# Patient Record
Sex: MALE | Race: BLACK OR AFRICAN AMERICAN | NOT HISPANIC OR LATINO | Employment: OTHER | URBAN - METROPOLITAN AREA
[De-identification: names, ages, dates, MRNs, and addresses within clinical notes are randomized per-mention and may not be internally consistent; named-entity substitution may affect disease eponyms.]

---

## 2017-01-01 ENCOUNTER — HOSPITAL ENCOUNTER (INPATIENT)
Facility: HOSPITAL | Age: 67
LOS: 1 days | DRG: 283 | End: 2017-11-20
Attending: EMERGENCY MEDICINE | Admitting: FAMILY MEDICINE
Payer: MEDICARE

## 2017-01-01 VITALS
BODY MASS INDEX: 17.37 KG/M2 | OXYGEN SATURATION: 73 % | HEART RATE: 98 BPM | TEMPERATURE: 98 F | RESPIRATION RATE: 22 BRPM | SYSTOLIC BLOOD PRESSURE: 117 MMHG | WEIGHT: 135.38 LBS | DIASTOLIC BLOOD PRESSURE: 42 MMHG | HEIGHT: 74 IN

## 2017-01-01 DIAGNOSIS — J96.02 ACUTE HYPERCAPNIC RESPIRATORY FAILURE: ICD-10-CM

## 2017-01-01 DIAGNOSIS — I21.3 STEMI (ST ELEVATION MYOCARDIAL INFARCTION): ICD-10-CM

## 2017-01-01 DIAGNOSIS — I21.02 ST ELEVATION MYOCARDIAL INFARCTION INVOLVING LEFT ANTERIOR DESCENDING (LAD) CORONARY ARTERY: Primary | ICD-10-CM

## 2017-01-01 DIAGNOSIS — N17.9 AKI (ACUTE KIDNEY INJURY): ICD-10-CM

## 2017-01-01 DIAGNOSIS — Z97.8 ENDOTRACHEALLY INTUBATED: ICD-10-CM

## 2017-01-01 DIAGNOSIS — I21.3 ACUTE ST ELEVATION MYOCARDIAL INFARCTION (STEMI), UNSPECIFIED ARTERY: ICD-10-CM

## 2017-01-01 DIAGNOSIS — I50.43 HEART FAILURE, ACUTE ON CHRONIC, SYSTOLIC AND DIASTOLIC: ICD-10-CM

## 2017-01-01 DIAGNOSIS — K72.00 SHOCK LIVER: ICD-10-CM

## 2017-01-01 DIAGNOSIS — Z72.0 TOBACCO ABUSE: Chronic | ICD-10-CM

## 2017-01-01 DIAGNOSIS — R73.9 HYPERGLYCEMIA: ICD-10-CM

## 2017-01-01 DIAGNOSIS — I21.9 ACUTE MI: ICD-10-CM

## 2017-01-01 DIAGNOSIS — E87.20 METABOLIC ACIDOSIS: ICD-10-CM

## 2017-01-01 DIAGNOSIS — R57.0 CARDIOGENIC SHOCK: ICD-10-CM

## 2017-01-01 DIAGNOSIS — I46.9 CARDIAC ARREST: ICD-10-CM

## 2017-01-01 LAB
ALBUMIN SERPL BCP-MCNC: 2.2 G/DL
ALBUMIN SERPL BCP-MCNC: 2.4 G/DL
ALBUMIN SERPL BCP-MCNC: 3.1 G/DL
ALLENS TEST: ABNORMAL
ALP SERPL-CCNC: 151 U/L
ALP SERPL-CCNC: 175 U/L
ALP SERPL-CCNC: 188 U/L
ALT SERPL W/O P-5'-P-CCNC: 572 U/L
ALT SERPL W/O P-5'-P-CCNC: 586 U/L
ALT SERPL W/O P-5'-P-CCNC: 662 U/L
AMORPH CRY URNS QL MICRO: ABNORMAL
ANION GAP SERPL CALC-SCNC: 13 MMOL/L
ANION GAP SERPL CALC-SCNC: 14 MMOL/L
ANION GAP SERPL CALC-SCNC: 15 MMOL/L
ANION GAP SERPL CALC-SCNC: 21 MMOL/L
APTT BLDCRRT: 26.4 SEC
APTT BLDCRRT: >150 SEC
AST SERPL-CCNC: 526 U/L
AST SERPL-CCNC: 531 U/L
AST SERPL-CCNC: 668 U/L
BACTERIA #/AREA URNS HPF: ABNORMAL /HPF
BASOPHILS # BLD AUTO: 0.02 K/UL
BASOPHILS # BLD AUTO: 0.02 K/UL
BASOPHILS # BLD AUTO: 0.05 K/UL
BASOPHILS NFR BLD: 0.2 %
BASOPHILS NFR BLD: 0.2 %
BASOPHILS NFR BLD: 0.7 %
BILIRUB SERPL-MCNC: 0.3 MG/DL
BILIRUB SERPL-MCNC: 0.3 MG/DL
BILIRUB SERPL-MCNC: 0.5 MG/DL
BILIRUB UR QL STRIP: NEGATIVE
BNP SERPL-MCNC: 101 PG/ML
BUN SERPL-MCNC: 22 MG/DL
BUN SERPL-MCNC: 27 MG/DL
BUN SERPL-MCNC: 28 MG/DL
BUN SERPL-MCNC: 31 MG/DL
CALCIUM SERPL-MCNC: 6.7 MG/DL
CALCIUM SERPL-MCNC: 7.3 MG/DL
CALCIUM SERPL-MCNC: 7.5 MG/DL
CALCIUM SERPL-MCNC: 8.9 MG/DL
CHLORIDE SERPL-SCNC: 108 MMOL/L
CHLORIDE SERPL-SCNC: 112 MMOL/L
CHLORIDE SERPL-SCNC: 114 MMOL/L
CHLORIDE SERPL-SCNC: 116 MMOL/L
CHOLEST SERPL-MCNC: 111 MG/DL
CHOLEST/HDLC SERPL: 3.3 {RATIO}
CK SERPL-CCNC: 160 U/L
CLARITY UR: ABNORMAL
CO2 SERPL-SCNC: 11 MMOL/L
CO2 SERPL-SCNC: 13 MMOL/L
CO2 SERPL-SCNC: 14 MMOL/L
CO2 SERPL-SCNC: 19 MMOL/L
COLOR UR: YELLOW
CREAT SERPL-MCNC: 1.5 MG/DL
CREAT SERPL-MCNC: 1.6 MG/DL
CREAT SERPL-MCNC: 1.8 MG/DL
CREAT SERPL-MCNC: 2.2 MG/DL
DELSYS: ABNORMAL
DIASTOLIC DYSFUNCTION: YES
DIFFERENTIAL METHOD: ABNORMAL
EOSINOPHIL # BLD AUTO: 0 K/UL
EOSINOPHIL # BLD AUTO: 0.1 K/UL
EOSINOPHIL # BLD AUTO: 0.2 K/UL
EOSINOPHIL NFR BLD: 0.2 %
EOSINOPHIL NFR BLD: 0.6 %
EOSINOPHIL NFR BLD: 2.5 %
ERYTHROCYTE [DISTWIDTH] IN BLOOD BY AUTOMATED COUNT: 13.1 %
ERYTHROCYTE [DISTWIDTH] IN BLOOD BY AUTOMATED COUNT: 13.2 %
ERYTHROCYTE [DISTWIDTH] IN BLOOD BY AUTOMATED COUNT: 13.2 %
ERYTHROCYTE [SEDIMENTATION RATE] IN BLOOD BY WESTERGREN METHOD: 16 MM/H
ERYTHROCYTE [SEDIMENTATION RATE] IN BLOOD BY WESTERGREN METHOD: 16 MM/H
ERYTHROCYTE [SEDIMENTATION RATE] IN BLOOD BY WESTERGREN METHOD: 20 MM/H
EST. GFR  (AFRICAN AMERICAN): 35 ML/MIN/1.73 M^2
EST. GFR  (AFRICAN AMERICAN): 44 ML/MIN/1.73 M^2
EST. GFR  (AFRICAN AMERICAN): 51 ML/MIN/1.73 M^2
EST. GFR  (AFRICAN AMERICAN): 54.9 ML/MIN/1.73 M^2
EST. GFR  (NON AFRICAN AMERICAN): 30 ML/MIN/1.73 M^2
EST. GFR  (NON AFRICAN AMERICAN): 38 ML/MIN/1.73 M^2
EST. GFR  (NON AFRICAN AMERICAN): 44 ML/MIN/1.73 M^2
EST. GFR  (NON AFRICAN AMERICAN): 47.5 ML/MIN/1.73 M^2
ESTIMATED AVG GLUCOSE: 105 MG/DL
ESTIMATED PA SYSTOLIC PRESSURE: 21.16
FIO2: 100
FIO2: 50
FIO2: 65
GLUCOSE SERPL-MCNC: 290 MG/DL
GLUCOSE SERPL-MCNC: 325 MG/DL
GLUCOSE SERPL-MCNC: 331 MG/DL
GLUCOSE SERPL-MCNC: 424 MG/DL
GLUCOSE UR QL STRIP: NEGATIVE
HBA1C MFR BLD HPLC: 5.3 %
HCO3 UR-SCNC: 15.7 MMOL/L (ref 24–28)
HCO3 UR-SCNC: 17.7 MMOL/L (ref 24–28)
HCO3 UR-SCNC: 19.5 MMOL/L (ref 24–28)
HCT VFR BLD AUTO: 38 %
HCT VFR BLD AUTO: 38.6 %
HCT VFR BLD AUTO: 48.5 %
HDLC SERPL-MCNC: 34 MG/DL
HDLC SERPL: 30.6 %
HGB BLD-MCNC: 11.9 G/DL
HGB BLD-MCNC: 12.1 G/DL
HGB BLD-MCNC: 15.6 G/DL
HGB UR QL STRIP: ABNORMAL
HYALINE CASTS #/AREA URNS LPF: 0 /LPF
INR PPP: 1.3
INR PPP: 1.6
KETONES UR QL STRIP: ABNORMAL
LDLC SERPL CALC-MCNC: 62.8 MG/DL
LEUKOCYTE ESTERASE UR QL STRIP: NEGATIVE
LYMPHOCYTES # BLD AUTO: 1.1 K/UL
LYMPHOCYTES # BLD AUTO: 1.9 K/UL
LYMPHOCYTES # BLD AUTO: 5.4 K/UL
LYMPHOCYTES NFR BLD: 19.3 %
LYMPHOCYTES NFR BLD: 74.9 %
LYMPHOCYTES NFR BLD: 9.3 %
MAGNESIUM SERPL-MCNC: 2 MG/DL
MCH RBC QN AUTO: 31.7 PG
MCH RBC QN AUTO: 32.2 PG
MCH RBC QN AUTO: 32.2 PG
MCHC RBC AUTO-ENTMCNC: 30.8 G/DL
MCHC RBC AUTO-ENTMCNC: 31.8 G/DL
MCHC RBC AUTO-ENTMCNC: 32.2 G/DL
MCV RBC AUTO: 100 FL
MCV RBC AUTO: 101 FL
MCV RBC AUTO: 103 FL
MICROSCOPIC COMMENT: ABNORMAL
MITRAL VALVE MOBILITY: NORMAL
MITRAL VALVE REGURGITATION: ABNORMAL
MODE: ABNORMAL
MONOCYTES # BLD AUTO: 0.3 K/UL
MONOCYTES # BLD AUTO: 0.7 K/UL
MONOCYTES # BLD AUTO: 0.8 K/UL
MONOCYTES NFR BLD: 4.6 %
MONOCYTES NFR BLD: 7.1 %
MONOCYTES NFR BLD: 7.2 %
NEUTROPHILS # BLD AUTO: 1 K/UL
NEUTROPHILS # BLD AUTO: 7.2 K/UL
NEUTROPHILS # BLD AUTO: 9.5 K/UL
NEUTROPHILS NFR BLD: 17.3 %
NEUTROPHILS NFR BLD: 72.8 %
NEUTROPHILS NFR BLD: 83.5 %
NITRITE UR QL STRIP: NEGATIVE
NONHDLC SERPL-MCNC: 77 MG/DL
PCO2 BLDA: 45.4 MMHG (ref 35–45)
PCO2 BLDA: 47.7 MMHG (ref 35–45)
PCO2 BLDA: 50.5 MMHG (ref 35–45)
PEEP: 0
PEEP: 5
PH SMN: 7.15 [PH] (ref 7.35–7.45)
PH SMN: 7.18 [PH] (ref 7.35–7.45)
PH SMN: 7.2 [PH] (ref 7.35–7.45)
PH UR STRIP: 5 [PH] (ref 5–8)
PLATELET # BLD AUTO: 83 K/UL
PLATELET # BLD AUTO: 90 K/UL
PLATELET # BLD AUTO: 93 K/UL
PMV BLD AUTO: 12.7 FL
PMV BLD AUTO: 13.2 FL
PMV BLD AUTO: 13.4 FL
PO2 BLDA: 320 MMHG (ref 80–100)
PO2 BLDA: 57 MMHG (ref 80–100)
PO2 BLDA: 90 MMHG (ref 80–100)
POC BE: -11 MMOL/L
POC BE: -13 MMOL/L
POC BE: -9 MMOL/L
POC SATURATED O2: 100 % (ref 95–100)
POC SATURATED O2: 81 % (ref 95–100)
POC SATURATED O2: 94 % (ref 95–100)
POCT GLUCOSE: 305 MG/DL (ref 70–110)
POCT GLUCOSE: 426 MG/DL (ref 70–110)
POTASSIUM SERPL-SCNC: 3.6 MMOL/L
POTASSIUM SERPL-SCNC: 4.3 MMOL/L
POTASSIUM SERPL-SCNC: 4.6 MMOL/L
POTASSIUM SERPL-SCNC: 5.6 MMOL/L
PROT SERPL-MCNC: 4.3 G/DL
PROT SERPL-MCNC: 4.8 G/DL
PROT SERPL-MCNC: 6.7 G/DL
PROT UR QL STRIP: ABNORMAL
PROTHROMBIN TIME: 13.4 SEC
PROTHROMBIN TIME: 15.9 SEC
RBC # BLD AUTO: 3.75 M/UL
RBC # BLD AUTO: 3.76 M/UL
RBC # BLD AUTO: 4.84 M/UL
RBC #/AREA URNS HPF: 10 /HPF (ref 0–4)
RETIRED EF AND QEF - SEE NOTES: 15 (ref 55–65)
SAMPLE: ABNORMAL
SITE: ABNORMAL
SODIUM SERPL-SCNC: 141 MMOL/L
SODIUM SERPL-SCNC: 142 MMOL/L
SODIUM SERPL-SCNC: 143 MMOL/L
SODIUM SERPL-SCNC: 144 MMOL/L
SP GR UR STRIP: 1.03 (ref 1–1.03)
SQUAMOUS #/AREA URNS HPF: 1 /HPF
T4 FREE SERPL-MCNC: 0.87 NG/DL
TRICUSPID VALVE REGURGITATION: ABNORMAL
TRIGL SERPL-MCNC: 71 MG/DL
TROPONIN I SERPL DL<=0.01 NG/ML-MCNC: 0.11 NG/ML
TROPONIN I SERPL DL<=0.01 NG/ML-MCNC: 13.71 NG/ML
TROPONIN I SERPL DL<=0.01 NG/ML-MCNC: 5.78 NG/ML
TSH SERPL DL<=0.005 MIU/L-ACNC: 0.09 UIU/ML
URN SPEC COLLECT METH UR: ABNORMAL
UROBILINOGEN UR STRIP-ACNC: NEGATIVE EU/DL
VT: 400
WBC # BLD AUTO: 11.38 K/UL
WBC # BLD AUTO: 7.21 K/UL
WBC # BLD AUTO: 9.9 K/UL
WBC #/AREA URNS HPF: 2 /HPF (ref 0–5)

## 2017-01-01 PROCEDURE — 27100108

## 2017-01-01 PROCEDURE — 83880 ASSAY OF NATRIURETIC PEPTIDE: CPT

## 2017-01-01 PROCEDURE — 99291 CRITICAL CARE FIRST HOUR: CPT | Mod: 25

## 2017-01-01 PROCEDURE — 25000003 PHARM REV CODE 250: Performed by: NURSE PRACTITIONER

## 2017-01-01 PROCEDURE — 85025 COMPLETE CBC W/AUTO DIFF WBC: CPT | Mod: 91

## 2017-01-01 PROCEDURE — 84484 ASSAY OF TROPONIN QUANT: CPT

## 2017-01-01 PROCEDURE — 20000000 HC ICU ROOM

## 2017-01-01 PROCEDURE — 83036 HEMOGLOBIN GLYCOSYLATED A1C: CPT

## 2017-01-01 PROCEDURE — 36415 COLL VENOUS BLD VENIPUNCTURE: CPT

## 2017-01-01 PROCEDURE — 85730 THROMBOPLASTIN TIME PARTIAL: CPT

## 2017-01-01 PROCEDURE — 36600 WITHDRAWAL OF ARTERIAL BLOOD: CPT

## 2017-01-01 PROCEDURE — 81000 URINALYSIS NONAUTO W/SCOPE: CPT

## 2017-01-01 PROCEDURE — 80053 COMPREHEN METABOLIC PANEL: CPT

## 2017-01-01 PROCEDURE — 85610 PROTHROMBIN TIME: CPT

## 2017-01-01 PROCEDURE — 96366 THER/PROPH/DIAG IV INF ADDON: CPT | Mod: 59

## 2017-01-01 PROCEDURE — 63600175 PHARM REV CODE 636 W HCPCS: Performed by: EMERGENCY MEDICINE

## 2017-01-01 PROCEDURE — 36556 INSERT NON-TUNNEL CV CATH: CPT

## 2017-01-01 PROCEDURE — 94760 N-INVAS EAR/PLS OXIMETRY 1: CPT

## 2017-01-01 PROCEDURE — 31500 INSERT EMERGENCY AIRWAY: CPT

## 2017-01-01 PROCEDURE — 27000221 HC OXYGEN, UP TO 24 HOURS

## 2017-01-01 PROCEDURE — 93010 ELECTROCARDIOGRAM REPORT: CPT | Mod: ,,, | Performed by: INTERNAL MEDICINE

## 2017-01-01 PROCEDURE — 80061 LIPID PANEL: CPT

## 2017-01-01 PROCEDURE — 96376 TX/PRO/DX INJ SAME DRUG ADON: CPT | Mod: 59

## 2017-01-01 PROCEDURE — 94003 VENT MGMT INPAT SUBQ DAY: CPT

## 2017-01-01 PROCEDURE — 82803 BLOOD GASES ANY COMBINATION: CPT

## 2017-01-01 PROCEDURE — 96365 THER/PROPH/DIAG IV INF INIT: CPT | Mod: 59

## 2017-01-01 PROCEDURE — 99291 CRITICAL CARE FIRST HOUR: CPT | Mod: ,,, | Performed by: INTERNAL MEDICINE

## 2017-01-01 PROCEDURE — 63600175 PHARM REV CODE 636 W HCPCS: Performed by: NURSE PRACTITIONER

## 2017-01-01 PROCEDURE — 99292 CRITICAL CARE ADDL 30 MIN: CPT | Mod: 25,,, | Performed by: NURSE PRACTITIONER

## 2017-01-01 PROCEDURE — 84439 ASSAY OF FREE THYROXINE: CPT

## 2017-01-01 PROCEDURE — 84443 ASSAY THYROID STIM HORMONE: CPT

## 2017-01-01 PROCEDURE — 25000003 PHARM REV CODE 250

## 2017-01-01 PROCEDURE — 99291 CRITICAL CARE FIRST HOUR: CPT | Mod: 25,,, | Performed by: NURSE PRACTITIONER

## 2017-01-01 PROCEDURE — 99900035 HC TECH TIME PER 15 MIN (STAT)

## 2017-01-01 PROCEDURE — 82550 ASSAY OF CK (CPK): CPT

## 2017-01-01 PROCEDURE — 85025 COMPLETE CBC W/AUTO DIFF WBC: CPT

## 2017-01-01 PROCEDURE — 93010 ELECTROCARDIOGRAM REPORT: CPT | Mod: 76,,, | Performed by: NUCLEAR MEDICINE

## 2017-01-01 PROCEDURE — 96367 TX/PROPH/DG ADDL SEQ IV INF: CPT | Mod: 59

## 2017-01-01 PROCEDURE — 31500 INSERT EMERGENCY AIRWAY: CPT | Mod: 59,,, | Performed by: NURSE PRACTITIONER

## 2017-01-01 PROCEDURE — 80053 COMPREHEN METABOLIC PANEL: CPT | Mod: 91

## 2017-01-01 PROCEDURE — 93306 TTE W/DOPPLER COMPLETE: CPT | Mod: 26,,, | Performed by: INTERNAL MEDICINE

## 2017-01-01 PROCEDURE — 80048 BASIC METABOLIC PNL TOTAL CA: CPT

## 2017-01-01 PROCEDURE — 25000003 PHARM REV CODE 250: Performed by: EMERGENCY MEDICINE

## 2017-01-01 PROCEDURE — 83735 ASSAY OF MAGNESIUM: CPT

## 2017-01-01 PROCEDURE — 27200966 HC CLOSED SUCTION SYSTEM

## 2017-01-01 PROCEDURE — C8929 TTE W OR WO FOL WCON,DOPPLER: HCPCS

## 2017-01-01 PROCEDURE — 63600175 PHARM REV CODE 636 W HCPCS

## 2017-01-01 PROCEDURE — 36620 INSERTION CATHETER ARTERY: CPT

## 2017-01-01 PROCEDURE — 92950 HEART/LUNG RESUSCITATION CPR: CPT

## 2017-01-01 PROCEDURE — 02HV33Z INSERTION OF INFUSION DEVICE INTO SUPERIOR VENA CAVA, PERCUTANEOUS APPROACH: ICD-10-PCS

## 2017-01-01 PROCEDURE — 36620 INSERTION CATHETER ARTERY: CPT | Mod: 59,,, | Performed by: NURSE PRACTITIONER

## 2017-01-01 PROCEDURE — 96368 THER/DIAG CONCURRENT INF: CPT | Mod: 59

## 2017-01-01 PROCEDURE — 25000003 PHARM REV CODE 250: Performed by: INTERNAL MEDICINE

## 2017-01-01 PROCEDURE — 84484 ASSAY OF TROPONIN QUANT: CPT | Mod: 91

## 2017-01-01 PROCEDURE — 97802 MEDICAL NUTRITION INDIV IN: CPT

## 2017-01-01 PROCEDURE — 0BH17EZ INSERTION OF ENDOTRACHEAL AIRWAY INTO TRACHEA, VIA NATURAL OR ARTIFICIAL OPENING: ICD-10-PCS

## 2017-01-01 PROCEDURE — 5A1935Z RESPIRATORY VENTILATION, LESS THAN 24 CONSECUTIVE HOURS: ICD-10-PCS

## 2017-01-01 PROCEDURE — 93005 ELECTROCARDIOGRAM TRACING: CPT

## 2017-01-01 PROCEDURE — 96375 TX/PRO/DX INJ NEW DRUG ADDON: CPT | Mod: 59

## 2017-01-01 PROCEDURE — 93010 ELECTROCARDIOGRAM REPORT: CPT | Mod: ,,, | Performed by: NUCLEAR MEDICINE

## 2017-01-01 PROCEDURE — 36556 INSERT NON-TUNNEL CV CATH: CPT | Mod: ,,, | Performed by: NURSE PRACTITIONER

## 2017-01-01 PROCEDURE — S0028 INJECTION, FAMOTIDINE, 20 MG: HCPCS | Performed by: NURSE PRACTITIONER

## 2017-01-01 PROCEDURE — 99900026 HC AIRWAY MAINTENANCE (STAT)

## 2017-01-01 RX ORDER — FENTANYL CITRAT/DEXTROSE 5%/PF 100 MCG/10
PATIENT CONTROLLED ANALGESIA SYRINGE INTRAVENOUS CONTINUOUS
Status: DISCONTINUED | OUTPATIENT
Start: 2017-01-01 | End: 2017-01-01

## 2017-01-01 RX ORDER — DOPAMINE HYDROCHLORIDE 160 MG/100ML
INJECTION, SOLUTION INTRAVENOUS
Status: COMPLETED
Start: 2017-01-01 | End: 2017-01-01

## 2017-01-01 RX ORDER — PROPOFOL 10 MG/ML
10 INJECTION, EMULSION INTRAVENOUS
Status: COMPLETED | OUTPATIENT
Start: 2017-01-01 | End: 2017-01-01

## 2017-01-01 RX ORDER — DOPAMINE HYDROCHLORIDE 160 MG/100ML
5 INJECTION, SOLUTION INTRAVENOUS CONTINUOUS
Status: DISCONTINUED | OUTPATIENT
Start: 2017-01-01 | End: 2017-01-01

## 2017-01-01 RX ORDER — FENTANYL CITRATE 50 UG/ML
100 INJECTION, SOLUTION INTRAMUSCULAR; INTRAVENOUS
Status: COMPLETED | OUTPATIENT
Start: 2017-01-01 | End: 2017-01-01

## 2017-01-01 RX ORDER — DEXMEDETOMIDINE HYDROCHLORIDE 4 UG/ML
0.2 INJECTION, SOLUTION INTRAVENOUS CONTINUOUS
Status: DISCONTINUED | OUTPATIENT
Start: 2017-01-01 | End: 2017-01-01

## 2017-01-01 RX ORDER — INSULIN ASPART 100 [IU]/ML
1-10 INJECTION, SOLUTION INTRAVENOUS; SUBCUTANEOUS EVERY 6 HOURS PRN
Status: DISCONTINUED | OUTPATIENT
Start: 2017-01-01 | End: 2017-01-01

## 2017-01-01 RX ORDER — ATORVASTATIN CALCIUM 40 MG/1
80 TABLET, FILM COATED ORAL DAILY
Status: DISCONTINUED | OUTPATIENT
Start: 2017-01-01 | End: 2017-01-01

## 2017-01-01 RX ORDER — ACETAMINOPHEN 650 MG/20.3ML
650 LIQUID ORAL EVERY 4 HOURS PRN
Status: DISCONTINUED | OUTPATIENT
Start: 2017-01-01 | End: 2017-01-01

## 2017-01-01 RX ORDER — NOREPINEPHRINE BITARTRATE/D5W 4MG/250ML
0.02 PLASTIC BAG, INJECTION (ML) INTRAVENOUS CONTINUOUS
Status: DISCONTINUED | OUTPATIENT
Start: 2017-01-01 | End: 2017-01-01

## 2017-01-01 RX ORDER — PROPOFOL 10 MG/ML
60 VIAL (ML) INTRAVENOUS ONCE
Status: COMPLETED | OUTPATIENT
Start: 2017-01-01 | End: 2017-01-01

## 2017-01-01 RX ORDER — ASPIRIN 325 MG
325 TABLET ORAL DAILY
Status: DISCONTINUED | OUTPATIENT
Start: 2017-01-01 | End: 2017-01-01 | Stop reason: DRUGHIGH

## 2017-01-01 RX ORDER — ASPIRIN 300 MG/1
300 SUPPOSITORY RECTAL
Status: COMPLETED | OUTPATIENT
Start: 2017-01-01 | End: 2017-01-01

## 2017-01-01 RX ORDER — CLOPIDOGREL 300 MG/1
300 TABLET, FILM COATED ORAL
Status: COMPLETED | OUTPATIENT
Start: 2017-01-01 | End: 2017-01-01

## 2017-01-01 RX ORDER — CLOPIDOGREL BISULFATE 75 MG/1
75 TABLET ORAL DAILY
Status: DISCONTINUED | OUTPATIENT
Start: 2017-01-01 | End: 2017-01-01

## 2017-01-01 RX ORDER — FAMOTIDINE 10 MG/ML
20 INJECTION INTRAVENOUS DAILY
Status: DISCONTINUED | OUTPATIENT
Start: 2017-01-01 | End: 2017-01-01

## 2017-01-01 RX ORDER — METOPROLOL SUCCINATE 25 MG/1
25 TABLET, EXTENDED RELEASE ORAL DAILY
COMMUNITY

## 2017-01-01 RX ORDER — GLUCAGON 1 MG
1 KIT INJECTION
Status: DISCONTINUED | OUTPATIENT
Start: 2017-01-01 | End: 2017-01-01

## 2017-01-01 RX ORDER — CHLORHEXIDINE GLUCONATE ORAL RINSE 1.2 MG/ML
15 SOLUTION DENTAL 2 TIMES DAILY
Status: DISCONTINUED | OUTPATIENT
Start: 2017-01-01 | End: 2017-01-01

## 2017-01-01 RX ORDER — SAXAGLIPTIN 5 MG/1
5 TABLET, FILM COATED ORAL DAILY
COMMUNITY

## 2017-01-01 RX ORDER — NAPROXEN SODIUM 220 MG/1
81 TABLET, FILM COATED ORAL DAILY
Status: DISCONTINUED | OUTPATIENT
Start: 2017-11-21 | End: 2017-01-01

## 2017-01-01 RX ORDER — MORPHINE SULFATE 10 MG/ML
10 INJECTION INTRAMUSCULAR; INTRAVENOUS; SUBCUTANEOUS
Status: DISCONTINUED | OUTPATIENT
Start: 2017-01-01 | End: 2017-01-01 | Stop reason: HOSPADM

## 2017-01-01 RX ORDER — PROPOFOL 10 MG/ML
INJECTION, EMULSION INTRAVENOUS
Status: DISPENSED
Start: 2017-01-01 | End: 2017-01-01

## 2017-01-01 RX ORDER — HEPARIN SODIUM 10000 [USP'U]/100ML
800 INJECTION, SOLUTION INTRAVENOUS CONTINUOUS
Status: DISCONTINUED | OUTPATIENT
Start: 2017-01-01 | End: 2017-01-01

## 2017-01-01 RX ORDER — SODIUM BICARBONATE 1 MEQ/ML
SYRINGE (ML) INTRAVENOUS CODE/TRAUMA/SEDATION MEDICATION
Status: COMPLETED | OUTPATIENT
Start: 2017-01-01 | End: 2017-01-01

## 2017-01-01 RX ORDER — BISACODYL 10 MG
10 SUPPOSITORY, RECTAL RECTAL DAILY PRN
Status: DISCONTINUED | OUTPATIENT
Start: 2017-01-01 | End: 2017-01-01

## 2017-01-01 RX ORDER — ASPIRIN 81 MG/1
81 TABLET ORAL DAILY
Status: DISCONTINUED | OUTPATIENT
Start: 2017-01-01 | End: 2017-01-01

## 2017-01-01 RX ORDER — ONDANSETRON 2 MG/ML
4 INJECTION INTRAMUSCULAR; INTRAVENOUS EVERY 8 HOURS PRN
Status: DISCONTINUED | OUTPATIENT
Start: 2017-01-01 | End: 2017-01-01 | Stop reason: HOSPADM

## 2017-01-01 RX ORDER — SODIUM BICARBONATE 1 MEQ/ML
50 SYRINGE (ML) INTRAVENOUS ONCE
Status: COMPLETED | OUTPATIENT
Start: 2017-01-01 | End: 2017-01-01

## 2017-01-01 RX ORDER — HEPARIN SODIUM 5000 [USP'U]/ML
4000 INJECTION, SOLUTION INTRAVENOUS; SUBCUTANEOUS ONCE
Status: COMPLETED | OUTPATIENT
Start: 2017-01-01 | End: 2017-01-01

## 2017-01-01 RX ORDER — EPINEPHRINE 1 MG/ML
INJECTION INTRAMUSCULAR; INTRAVENOUS; SUBCUTANEOUS CODE/TRAUMA/SEDATION MEDICATION
Status: COMPLETED | OUTPATIENT
Start: 2017-01-01 | End: 2017-01-01

## 2017-01-01 RX ORDER — HEPARIN SODIUM 5000 [USP'U]/ML
3000 INJECTION, SOLUTION INTRAVENOUS; SUBCUTANEOUS
Status: DISCONTINUED | OUTPATIENT
Start: 2017-01-01 | End: 2017-01-01

## 2017-01-01 RX ORDER — SODIUM CHLORIDE 9 MG/ML
INJECTION, SOLUTION INTRAVENOUS CONTINUOUS
Status: DISCONTINUED | OUTPATIENT
Start: 2017-01-01 | End: 2017-01-01

## 2017-01-01 RX ORDER — NOREPINEPHRINE BITARTRATE/D5W 4MG/250ML
PLASTIC BAG, INJECTION (ML) INTRAVENOUS
Status: COMPLETED
Start: 2017-01-01 | End: 2017-01-01

## 2017-01-01 RX ORDER — PROPOFOL 10 MG/ML
20 INJECTION, EMULSION INTRAVENOUS CONTINUOUS
Status: DISCONTINUED | OUTPATIENT
Start: 2017-01-01 | End: 2017-01-01

## 2017-01-01 RX ADMIN — CHLORHEXIDINE GLUCONATE 15 ML: 1.2 RINSE ORAL at 08:11

## 2017-01-01 RX ADMIN — INSULIN ASPART 8 UNITS: 100 INJECTION, SOLUTION INTRAVENOUS; SUBCUTANEOUS at 12:11

## 2017-01-01 RX ADMIN — EPINEPHRINE 1 MG: 1 INJECTION PARENTERAL at 02:11

## 2017-01-01 RX ADMIN — HEPARIN SODIUM AND DEXTROSE 800 UNITS/HR: 10000; 5 INJECTION INTRAVENOUS at 10:11

## 2017-01-01 RX ADMIN — FENTANYL CITRATE 100 MCG: 50 INJECTION, SOLUTION INTRAMUSCULAR; INTRAVENOUS at 12:11

## 2017-01-01 RX ADMIN — SODIUM BICARBONATE 50 MEQ: 84 INJECTION, SOLUTION INTRAVENOUS at 07:11

## 2017-01-01 RX ADMIN — FENTANYL CITRATE 100 MCG: 50 INJECTION INTRAMUSCULAR; INTRAVENOUS at 10:11

## 2017-01-01 RX ADMIN — SODIUM BICARBONATE 50 MEQ: 84 INJECTION, SOLUTION INTRAVENOUS at 02:11

## 2017-01-01 RX ADMIN — CHLORHEXIDINE GLUCONATE 15 ML: 1.2 RINSE ORAL at 09:11

## 2017-01-01 RX ADMIN — Medication 50 MCG/HR: at 11:11

## 2017-01-01 RX ADMIN — Medication 125 MCG/HR: at 11:11

## 2017-01-01 RX ADMIN — ASPIRIN 300 MG: 300 SUPPOSITORY RECTAL at 09:11

## 2017-01-01 RX ADMIN — FENTANYL CITRATE 100 MCG: 50 INJECTION INTRAMUSCULAR; INTRAVENOUS at 11:11

## 2017-01-01 RX ADMIN — PROPOFOL 10 MCG/KG/MIN: 10 INJECTION, EMULSION INTRAVENOUS at 10:11

## 2017-01-01 RX ADMIN — HEPARIN SODIUM 4000 UNITS: 5000 INJECTION, SOLUTION INTRAVENOUS; SUBCUTANEOUS at 10:11

## 2017-01-01 RX ADMIN — INSULIN ASPART 10 UNITS: 100 INJECTION, SOLUTION INTRAVENOUS; SUBCUTANEOUS at 09:11

## 2017-01-01 RX ADMIN — SODIUM CHLORIDE: 0.9 INJECTION, SOLUTION INTRAVENOUS at 11:11

## 2017-01-01 RX ADMIN — NOREPINEPHRINE BITARTRATE 1.7 MCG/KG/MIN: 1 INJECTION, SOLUTION, CONCENTRATE INTRAVENOUS at 12:11

## 2017-01-01 RX ADMIN — ASPIRIN 325 MG ORAL TABLET 325 MG: 325 PILL ORAL at 01:11

## 2017-01-01 RX ADMIN — SODIUM BICARBONATE: 84 INJECTION, SOLUTION INTRAVENOUS at 07:11

## 2017-01-01 RX ADMIN — ASPIRIN 81 MG: 81 TABLET, COATED ORAL at 08:11

## 2017-01-01 RX ADMIN — TENECTEPLASE 35 MG: KIT at 09:11

## 2017-01-01 RX ADMIN — MORPHINE SULFATE 10 MG: 10 INJECTION, SOLUTION INTRAMUSCULAR; INTRAVENOUS at 02:11

## 2017-01-01 RX ADMIN — Medication 0.02 MCG/KG/MIN: at 03:11

## 2017-01-01 RX ADMIN — Medication 0.16 MCG/KG/MIN: at 06:11

## 2017-01-01 RX ADMIN — FAMOTIDINE 20 MG: 10 INJECTION, SOLUTION INTRAVENOUS at 08:11

## 2017-01-01 RX ADMIN — INSULIN HUMAN 10 UNITS: 100 INJECTION, SOLUTION PARENTERAL at 09:11

## 2017-01-01 RX ADMIN — SODIUM CHLORIDE 1000 ML: 0.9 INJECTION, SOLUTION INTRAVENOUS at 02:11

## 2017-01-01 RX ADMIN — Medication 100 MCG/HR: at 09:11

## 2017-01-01 RX ADMIN — CLOPIDOGREL BISULFATE 300 MG: 300 TABLET, FILM COATED ORAL at 01:11

## 2017-01-01 RX ADMIN — DOPAMINE HYDROCHLORIDE 5 MCG/KG/MIN: 160 INJECTION, SOLUTION INTRAVENOUS at 01:11

## 2017-01-01 RX ADMIN — CLOPIDOGREL BISULFATE 75 MG: 75 TABLET ORAL at 08:11

## 2017-01-01 RX ADMIN — ATORVASTATIN CALCIUM 80 MG: 40 TABLET, FILM COATED ORAL at 03:11

## 2017-01-01 RX ADMIN — DEXMEDETOMIDINE HYDROCHLORIDE 0.2 MCG/KG/HR: 4 INJECTION, SOLUTION INTRAVENOUS at 11:11

## 2017-01-01 RX ADMIN — NOREPINEPHRINE BITARTRATE 0.5 MCG/KG/MIN: 1 INJECTION, SOLUTION, CONCENTRATE INTRAVENOUS at 07:11

## 2017-01-01 RX ADMIN — SODIUM CHLORIDE 1000 ML: 0.9 INJECTION, SOLUTION INTRAVENOUS at 10:11

## 2017-01-01 RX ADMIN — PROPOFOL 60 MG: 10 INJECTION, EMULSION INTRAVENOUS at 09:11

## 2017-11-20 PROBLEM — R74.01 TRANSAMINITIS: Status: ACTIVE | Noted: 2017-01-01

## 2017-11-20 PROBLEM — I21.3 ACUTE ST ELEVATION MYOCARDIAL INFARCTION (STEMI): Status: ACTIVE | Noted: 2017-01-01

## 2017-11-20 PROBLEM — E87.20 METABOLIC ACIDOSIS: Status: ACTIVE | Noted: 2017-01-01

## 2017-11-20 PROBLEM — I50.43 HEART FAILURE, ACUTE ON CHRONIC, SYSTOLIC AND DIASTOLIC: Status: ACTIVE | Noted: 2017-01-01

## 2017-11-20 PROBLEM — I46.9 CARDIAC ARREST WITH VENTRICULAR FIBRILLATION: Status: ACTIVE | Noted: 2017-01-01

## 2017-11-20 PROBLEM — N17.9 AKI (ACUTE KIDNEY INJURY): Status: ACTIVE | Noted: 2017-01-01

## 2017-11-20 PROBLEM — E87.4 METABOLIC ACIDOSIS WITH RESPIRATORY ACIDOSIS: Status: ACTIVE | Noted: 2017-01-01

## 2017-11-20 PROBLEM — I49.01 CARDIAC ARREST WITH VENTRICULAR FIBRILLATION: Status: ACTIVE | Noted: 2017-01-01

## 2017-11-20 PROBLEM — J96.02 ACUTE HYPERCAPNIC RESPIRATORY FAILURE: Status: ACTIVE | Noted: 2017-01-01

## 2017-11-20 PROBLEM — R74.01 TRANSAMINITIS: Status: RESOLVED | Noted: 2017-01-01 | Resolved: 2017-01-01

## 2017-11-20 PROBLEM — R57.0 CARDIOGENIC SHOCK: Status: ACTIVE | Noted: 2017-01-01

## 2017-11-20 PROBLEM — I21.02 ST ELEVATION MYOCARDIAL INFARCTION INVOLVING LEFT ANTERIOR DESCENDING (LAD) CORONARY ARTERY: Status: ACTIVE | Noted: 2017-01-01

## 2017-11-20 PROBLEM — E87.4 METABOLIC ACIDOSIS WITH RESPIRATORY ACIDOSIS: Status: RESOLVED | Noted: 2017-01-01 | Resolved: 2017-01-01

## 2017-11-20 PROBLEM — K72.00 SHOCK LIVER: Status: ACTIVE | Noted: 2017-01-01

## 2017-11-20 PROBLEM — R73.9 HYPERGLYCEMIA: Status: ACTIVE | Noted: 2017-01-01

## 2017-11-20 PROBLEM — I50.20 HEART FAILURE WITH REDUCED EJECTION FRACTION: Status: ACTIVE | Noted: 2017-01-01

## 2017-11-20 NOTE — H&P
[facility] Hospital Medicine  History & Physical    Patient Name: Pawan Huang  MRN: [id number]  Admission Date: 11/19/2017  Attending Physician: Quin Yañze MD   Primary Care Provider: Hang Sanchez MD         Patient information was obtained from past medical records and ER records.     Subjective:     Principal Problem:ST elevation myocardial infarction involving left anterior descending (LAD) coronary artery    Chief Complaint:   Chief Complaint   Patient presents with    Cardiac Arrest        HPI: History obtained from medical record due to patient's clinical condition.  Mr. Huang is a 68yo male with a PMHx of HTN, tobacco use, and recurrent nonprovoked PE/DVT, who presented to [facility] in [facility] per EMS after witnessed cardiac arrest.  Bystanders initiated CPR immediately.  When [facility] arrived, patient was found to be in V-Fib, and was shocked twice with ROSC.  Upon arrival to the ED, patient was pulseless and CPR initiated.  IV amio 300 mg x 1 dose given with ROSC. Patient was intubated.  According to [facility] ED note, post cardiac arrest EKG revealed NSR with ST elevations in leads V3-V6 (no EKG available in chart for review).  Cardiology was consulted in ED, and recommended tPA, heparin drip, and ASA which were given per [facility] ED.  Subsequently, patient was transferred to [facility] for Cardiology services.  Hospital Medicine was consulted for ICU admission.  Currently, patient is sedated and intubated.  He remains hemodynamically stable without pressors.      Past Medical History:   Diagnosis Date    Bilateral pulmonary embolism 2/23/2016    Current smoker     Hypertension        History reviewed. No pertinent surgical history.    Review of patient's allergies indicates:  No Known Allergies    No current facility-administered medications on file prior to encounter.      Current Outpatient Prescriptions on File Prior to Encounter   Medication Sig    atorvastatin (LIPITOR)  40 MG tablet     lisinopril (PRINIVIL,ZESTRIL) 2.5 MG tablet     metoprolol tartrate (LOPRESSOR) 25 MG tablet Take 1 tablet (25 mg total) by mouth 2 (two) times daily.    rivaroxaban (XARELTO) 20 mg Tab Take 1 tablet (20 mg total) by mouth daily with dinner or evening meal.     Family History     Problem Relation (Age of Onset)    Cancer Mother    Deep vein thrombosis Mother, Brother, Sister    Diabetes Mellitus Sister    Heart attacks under age 50 Father    Hypertension Sister        Social History Main Topics    Smoking status: Current Every Day Smoker     Packs/day: 0.50     Years: 45.00     Types: Cigarettes    Smokeless tobacco: Former User     Quit date: 2/23/2016    Alcohol use 0.0 oz/week      Comment: Drinks socially, maybe one drink a month    Drug use: No    Sexual activity: Yes     Review of Systems   Unable to perform ROS: Intubated     Objective:     Vital Signs (Most Recent):  Temp: 97.9 °F (36.6 °C) (11/19/17 2144)  Pulse: 86 (Simultaneous filing. User may not have seen previous data.) (11/19/17 2342)  Resp: 18 (11/19/17 2342)  BP: 95/63 (11/19/17 2342)  SpO2: (!) 94 % (11/19/17 2342) Vital Signs (24h Range):  Temp:  [97.9 °F (36.6 °C)] 97.9 °F (36.6 °C)  Pulse:  [49-93] 86  Resp:  [17-25] 18  SpO2:  [93 %-100 %] 94 %  BP: ()/(56-78) 95/63     Weight: 66.9 kg (147 lb 7 oz)  Body mass index is 21.77 kg/m².    Physical Exam   Constitutional: He appears cachectic. He is intubated.   HENT:   Head: Normocephalic and atraumatic.   Right Ear: External ear normal.   Left Ear: External ear normal.   Dry blood of mouth    Neck: Neck supple.   Cardiovascular: Normal rate, regular rhythm, normal heart sounds and intact distal pulses.    Pulmonary/Chest: He is intubated. He has rhonchi.   Abdominal: Soft. Bowel sounds are absent.   Genitourinary: Penis normal.   Neurological: He is unresponsive.   Skin:   Cool and dry   Psychiatric:   unresponsive   Nursing note and vitals reviewed.        Significant Labs:   Results for orders placed or performed during the hospital encounter of 11/19/17   CBC auto differential   Result Value Ref Range    WBC 7.21 3.90 - 12.70 K/uL    RBC 4.84 4.60 - 6.20 M/uL    Hemoglobin 15.6 14.0 - 18.0 g/dL    Hematocrit 48.5 40.0 - 54.0 %     (H) 82 - 98 fL    MCH 32.2 (H) 27.0 - 31.0 pg    MCHC 32.2 32.0 - 36.0 g/dL    RDW 13.1 11.5 - 14.5 %    Platelets 83 (L) 150 - 350 K/uL    MPV 13.2 (H) 9.2 - 12.9 fL    Gran # 1.0 (L) 1.8 - 7.7 K/uL    Lymph # 5.4 (H) 1.0 - 4.8 K/uL    Mono # 0.3 0.3 - 1.0 K/uL    Eos # 0.2 0.0 - 0.5 K/uL    Baso # 0.05 0.00 - 0.20 K/uL    Gran% 17.3 (L) 38.0 - 73.0 %    Lymph% 74.9 (H) 18.0 - 48.0 %    Mono% 4.6 4.0 - 15.0 %    Eosinophil% 2.5 0.0 - 8.0 %    Basophil% 0.7 0.0 - 1.9 %    Differential Method Automated    Comprehensive metabolic panel   Result Value Ref Range    Sodium 143 136 - 145 mmol/L    Potassium 3.6 3.5 - 5.1 mmol/L    Chloride 108 95 - 110 mmol/L    CO2 14 (L) 23 - 29 mmol/L    Glucose 290 (H) 70 - 110 mg/dL    BUN, Bld 22 8 - 23 mg/dL    Creatinine 1.5 (H) 0.5 - 1.4 mg/dL    Calcium 8.9 8.7 - 10.5 mg/dL    Total Protein 6.7 6.0 - 8.4 g/dL    Albumin 3.1 (L) 3.5 - 5.2 g/dL    Total Bilirubin 0.5 0.1 - 1.0 mg/dL    Alkaline Phosphatase 188 (H) 55 - 135 U/L     (H) 10 - 40 U/L     (H) 10 - 44 U/L    Anion Gap 21 (H) 8 - 16 mmol/L    eGFR if African American 54.9 (A) >60 mL/min/1.73 m^2    eGFR if non  47.5 (A) >60 mL/min/1.73 m^2   Brain natriuretic peptide   Result Value Ref Range     (H) 0 - 99 pg/mL   CK   Result Value Ref Range     20 - 200 U/L   Troponin I   Result Value Ref Range    Troponin I 0.106 (H) 0.000 - 0.026 ng/mL   APTT   Result Value Ref Range    aPTT 26.4 21.0 - 32.0 sec   Protime-INR   Result Value Ref Range    Prothrombin Time 13.4 (H) 9.0 - 12.5 sec    INR 1.3 (H) 0.8 - 1.2   ISTAT PROCEDURE   Result Value Ref Range    POC PH 7.177 (LL) 7.35 - 7.45    POC PCO2  47.7 (H) 35 - 45 mmHg    POC PO2 57 (LL) 80 - 100 mmHg    POC HCO3 17.7 (L) 24 - 28 mmol/L    POC BE -11 -2 to 2 mmol/L    POC SATURATED O2 81 (L) 95 - 100 %    Rate 16     Sample ARTERIAL     Site RR     Allens Test Pass     DelSys Adult Vent     Mode AC/PRVC     Vt 400     PEEP 5     FiO2 50       All pertinent labs within the past 24 hours have been reviewed.    Significant Imaging:   Imaging Results          X-Ray Chest 1 View (In process)                X-Ray Chest AP Portable (Final result)  Result time 11/19/17 21:52:56    Final result by Wendy Ravi MD (11/19/17 21:52:56)                 Impression:      There is patchy consolidation bilaterally greatest in the right upper lobe.  An endotracheal tube is present with its tip just above the precious.      Electronically signed by: WENDY RAVI MD  Date:     11/19/17  Time:    21:52              Narrative:    Exam: XR CHEST AP PORTABLE,    Date:  11/19/17 21:42:19    History: Shortness of breath    Comparison:  2/ 23/ 2016.    Findings: An endotracheal tube is present with its tip at the level of T6 just above the precious.  There is patchy consolidation in the lungs bilaterally consistent right upper lobe.  No pneumothorax is demonstrated.  The heart is normal in size.                             I have reviewed all pertinent imaging results/findings within the past 24 hours.    Assessment/Plan:     * ST elevation myocardial infarction involving left anterior descending (LAD) coronary artery    Patient will be medically treated for now given he had witnessed cardiac arrest.  He is on heparin, IV pressors and was given ASA/Plavix/Tenecteplase in the ED and cardiology will see          Cardiac arrest with ventricular fibrillation    Continue with post arrest protocol            VTE Risk Mitigation         Ordered     heparin 25,000 units in dextrose 5% 250 mL (100 units/mL) infusion (heparin infusion)  Continuous     Route:  Intravenous        11/19/17 5855      heparin (porcine) injection 3,000 Units  As needed (PRN)     Route:  Intravenous        11/19/17 5461        Critical care time spent on the evaluation and treatment of severe organ dysfunction, review of pertinent labs and imaging studies, discussions with consulting providers and discussions with patient/family: 60 minutes.     Quin Yañez MD  Department of Hospital Medicine   [facility]

## 2017-11-20 NOTE — ASSESSMENT & PLAN NOTE
Nutrition Problem:  Inadequate energy intake     Related to (etiology):   Inability to consume sufficient energy     Signs and Symptoms (as evidenced by):   Intubated, NPO, no alternative means of nutrition.     Nutrition Diagnosis Status:   New

## 2017-11-20 NOTE — NURSING
Patient had cardiac arrest earlier, full CPR and ACLS protocol perofrmed. Family notified. DNR status obtained on patient. Family wishes for withdrawal of care. Family came to bedside, all gtts stopped, patient extubated, patient given morphine for comfort. Patient  at 15:10 and pronounced by Dr Mcdonough.

## 2017-11-20 NOTE — ASSESSMENT & PLAN NOTE
- home med: saxagliptin  - hyperglycemic on presentation  - SSI q4 hours, IVP insulin this am for glucose >400  - HgA1C in am

## 2017-11-20 NOTE — CONSULTS
[facility]   Adult Nutrition  Consult Note    SUMMARY     Recommendations    Recommendation/Intervention: 1.Consider tube feed for nutrition support until extubated: Novasource at 35 ml/hr with 1, with flushes as needed per MD or NP for hydration. Provides 1680 calories, 76 g protein, 602 ml water. 2. Hold for residuals >500 mL. 3.Will continue to monitor.   Goals: Meet 85- 100 % of estimated nutritional needs without s/s of GI distress   Nutrition Goal Status: new  Communication of RD Recs: discussed on rounds    Reason for Assessment    Reason for Assessment: nurse/nurse practitioner consult (TF recs)   Diagnosis:  1. ST elevation myocardial infarction involving left anterior descending (LAD) coronary artery    2. Cardiac arrest    3. Acute MI    4. Endotracheally intubated    5. Metabolic acidosis    6. STEMI (ST elevation myocardial infarction)    7. Cardiogenic shock    8. Acute hypercapnic respiratory failure    9. Acute ST elevation myocardial infarction (STEMI), unspecified artery    10. SELENE (acute kidney injury)    11. Hyperglycemia    12. Shock liver    13. Tobacco abuse    14. Heart failure, acute on chronic, systolic and diastolic      Past Medical History:   Diagnosis Date    Bilateral pulmonary embolism 2/23/2016    CAD S/P percutaneous coronary angioplasty 2010    Cardiomyopathy     Current smoker     DVT (deep venous thrombosis)     Hypertension            Interdisciplinary Rounds: attended     General Information Comments: Per ICU staff: Patient intubated and sedated.     Nutrition Discharge Planning: too soon to determine     Nutrition Prescription Ordered    Current Diet Order: NPO    Evaluation of Received Nutrients/Fluid Intake    Energy Calories Required: not meeting needs  Protein Required: not meeting needs    Intake/Output Summary (Last 24 hours) at 11/20/17 1125  Last data filed at 11/20/17 0817   Gross per 24 hour   Intake           3370.8 ml   Output               "185 ml   Net           3185.8 ml             % Intake of Estimated Energy Needs: 0 - 25 %  % Meal Intake: NPO     Nutrition Risk Screen     Nutrition Risk Screen: other (see comments) (intubated)    Nutrition/Diet History       Typical Food/Fluid Intake: ROSA ELENA  Food Preferences: no Faith/cultural food preferences noted.         Factors Affecting Nutritional Intake: NPO (intubated)    Labs/Tests/Procedures/Meds       Pertinent Labs Reviewed: reviewed, pertinent      BMP  Lab Results   Component Value Date     11/20/2017    K 5.6 (H) 11/20/2017     (H) 11/20/2017    CO2 13 (L) 11/20/2017    BUN 31 (H) 11/20/2017    CREATININE 2.2 (H) 11/20/2017    CALCIUM 6.7 (LL) 11/20/2017    ANIONGAP 15 11/20/2017    ESTGFRAFRICA 35 (A) 11/20/2017    EGFRNONAA 30 (A) 11/20/2017     Lab Results   Component Value Date    ALBUMIN 2.4 (L) 11/20/2017     Lab Results   Component Value Date    CALCIUM 6.7 (LL) 11/20/2017       Recent Labs  Lab 11/20/17  0829   POCTGLUCOSE 426*       Pertinent Medications Reviewed: reviewed       Physical Findings    Overall Physical Appearance: on ventilator support  Tubes:  (orogastric)  Oral/Mouth Cavity:  (teeth missing)  Skin:  (Brad Score 10 )    Anthropometrics    Temp: 97.5 °F (36.4 °C)     Height: 6' 2" (188 cm)  Weight Method: Bed Scale  Weight: 61.4 kg (135 lb 5.8 oz)  Ideal Body Weight (IBW), Male: 190 lb     % Ideal Body Weight, Male (lb): 71.24 lb     BMI (Calculated): 17.4  BMI Grade: 17 - 18.4 protein-energy malnutrition grade I    Estimated/Assessed Needs    Weight Used For Calorie Calculations: 61.4 kg (135 lb 5.8 oz)   Height (cm): 188 cm  Energy Calorie Requirements (kcal): 1533  Energy Need Method: [facility]     RMR (Hunt-St. Jeor Equation): 1458.75        Weight Used For Protein Calculations: 61.4 kg (135 lb 5.8 oz)     1.2 gm Protein (gm): 73.83 and 1.5 gm Protein (gm): 92.29     RDA Method (mL): 1533         Assessment and Plan    Acute hypercapnic respiratory " failure      Nutrition Problem:  Inadequate energy intake     Related to (etiology):   Inability to consume sufficient energy     Signs and Symptoms (as evidenced by):   Intubated, NPO, no alternative means of nutrition.     Nutrition Diagnosis Status:   New              Monitor and Evaluation    Food and Nutrient Intake: energy intake, enteral nutrition intake  Food and Nutrient Adminstration: enteral and parenteral nutrition administration  Anthropometric Measurements: weight  Biochemical Data, Medical Tests and Procedures: electrolyte and renal panel, glucose/endocrine profile  Nutrition-Focused Physical Findings: overall appearance      Nutrition Follow-Up    RD Follow-up?: Yes (2xweekly)

## 2017-11-20 NOTE — PROCEDURES
"Pawan Huang is a 67 y.o. male patient.    Temp: 97.5 °F (36.4 °C) (11/20/17 0700)  Pulse: 65 (11/20/17 1015)  Resp: (!) 24 (11/20/17 1015)  BP: (!) 84/18 (11/20/17 1015)  SpO2: (!) 92 % (11/20/17 0945)  Weight: 61.4 kg (135 lb 5.8 oz) (11/20/17 0315)  Height: 6' 2" (188 cm) (11/20/17 0315)       Intubation  Date/Time: 11/20/2017 9:30 AM  Location procedure was performed: [facility] INTENSIVE CARE UNIT  Performed by: EDER GAMBOA  Authorized by: EDER GAMBOA   Pre-operative diagnosis: resp failure  Post-operative diagnosis: resp failure  Consent Done: Emergent Situation  Indications: hypoxemia and  respiratory distress  Intubation method: oral  Patient status: awake  Preoxygenation: bag valve mask  Sedatives: propofol  Paralytic: none  Laryngoscope size: Mac 4  Tube size: 8.0 mm  Tube type: cuffed  Number of attempts: 1  Cricoid pressure: no  Cords visualized: yes  Post-procedure assessment: chest rise and CO2 detector  Breath sounds: rales/crackles and equal and absent over the epigastrium  Cuff inflated: yes  ETT to lip: 24 cm  Tube secured with: umbilical tape  Chest x-ray interpreted by me.  Chest x-ray findings: endotracheal tube in appropriate position  Patient tolerance: Patient tolerated the procedure well with no immediate complications          Eder Gamboa  11/20/2017  "

## 2017-11-20 NOTE — PLAN OF CARE
Problem: Patient Care Overview  Goal: Plan of Care Review  Outcome: Ongoing (interventions implemented as appropriate)  poc reviewed c pt and family, all questions answered. Pt arrived from  ER at 0315 to ICU 5. Hypotensive upon arrival. Norepinephrine gtt started per Christ Escoto MD . Precedex & Fentanyl gtt continued for RASS -1. Heparin gtt continuous at 800 u/hr. NS at 150 ml/hr. Dopamine gtt titrated for MAP >65. Intubated upon arrival to ICU. TMAX 97.6 this shift. Bloody drainage coming from OG tube at this time. ST this shift, UOP remains 0. Will continue to monitor.

## 2017-11-20 NOTE — SUBJECTIVE & OBJECTIVE
Past Medical History:   Diagnosis Date    Bilateral pulmonary embolism 2/23/2016    CAD S/P percutaneous coronary angioplasty 2010    Cardiomyopathy     Current smoker     DVT (deep venous thrombosis)     Hypertension        Past Surgical History:   Procedure Laterality Date    CORONARY ANGIOPLASTY WITH STENT PLACEMENT  2010       Review of patient's allergies indicates:  No Known Allergies    Family History     Problem Relation (Age of Onset)    Cancer Mother    Deep vein thrombosis Mother, Brother, Sister    Diabetes Mellitus Sister    Heart attacks under age 50 Father    Hypertension Sister        Social History Main Topics    Smoking status: Current Every Day Smoker     Packs/day: 0.50     Years: 45.00     Types: Cigarettes    Smokeless tobacco: Former User     Quit date: 2/23/2016    Alcohol use 0.0 oz/week      Comment: Drinks socially, maybe one drink a month    Drug use: No    Sexual activity: Yes         Review of Systems   Unable to perform ROS: Intubated     Objective:     Vital Signs (Most Recent):  Temp: 97.5 °F (36.4 °C) (11/20/17 0700)  Pulse: (!) 58 (11/20/17 0844)  Resp: (!) 22 (11/20/17 0844)  BP: 112/77 (11/20/17 0835)  SpO2: 99 % (11/20/17 0844) Vital Signs (24h Range):  Temp:  [97.4 °F (36.3 °C)-97.9 °F (36.6 °C)] 97.5 °F (36.4 °C)  Pulse:  [] 58  Resp:  [10-35] 22  SpO2:  [73 %-100 %] 99 %  BP: ()/(28-78) 112/77     Weight: 61.4 kg (135 lb 5.8 oz)  Body mass index is 17.38 kg/m².      Intake/Output Summary (Last 24 hours) at 11/20/17 0849  Last data filed at 11/20/17 0817   Gross per 24 hour   Intake          3367.97 ml   Output              185 ml   Net          3182.97 ml       Physical Exam   Constitutional: He appears well-developed and well-nourished. He is easily aroused. He has a sickly appearance. He appears ill. No distress. He is intubated.   HENT:   Head: Normocephalic and atraumatic.   Mouth/Throat: Mucous membranes are normal.   Old blood noted in oral  cavity and pharynx   Eyes: EOM and lids are normal.   Pupils pinpoint   Neck: Trachea normal and normal range of motion. Carotid bruit is not present.   Cardiovascular: Normal rate, regular rhythm and normal heart sounds.    Pulses:       Radial pulses are 1+ on the right side, and 1+ on the left side.        Dorsalis pedis pulses are 1+ on the right side, and 1+ on the left side.   Pulmonary/Chest: Effort normal and breath sounds normal. No accessory muscle usage. He is intubated. No respiratory distress.   Abdominal: Soft. Normal appearance. He exhibits no distension. Bowel sounds are decreased. There is no tenderness.   Genitourinary: Penis normal.   Genitourinary Comments: Herrera catheter   Musculoskeletal: Normal range of motion.        Right foot: There is no deformity.        Left foot: There is no deformity.   No edema   Lymphadenopathy:     He has no cervical adenopathy.   Neurological: He is alert and easily aroused.   Eyes open and following commands intermittently on SAT   Skin: Skin is warm, dry and intact. Capillary refill takes more than 3 seconds. No rash noted. No cyanosis. Nails show clubbing.            Vents:  Vent Mode: Spont (11/20/17 0844)  Set Rate: 0 bmp (11/20/17 0844)  Vt Set: 450 mL (11/20/17 0844)  Pressure Support: 8 cmH20 (11/20/17 0844)  PEEP/CPAP: 3 cmH20 (11/20/17 0844)  Oxygen Concentration (%): 35 (11/20/17 0835)  Peak Airway Pressure: 11 cmH2O (11/20/17 0844)  Plateau Pressure: 0 cmH20 (11/20/17 0844)  Total Ve: 5.53 mL (11/20/17 0844)  F/VT Ratio<105 (RSBI): (!) 54.23 (11/20/17 0757)    Lines/Drains/Airways     Drain                 NG/OG Tube 11/20/17 0031 [place] sump 16 Fr. Left mouth less than 1 day         Urethral Catheter 11/19/17 2220 Latex 16 Fr. less than 1 day          Airway                 Airway - Non-Surgical 11/19/17 2136 Endotracheal Tube less than 1 day          Peripheral Intravenous Line                 Peripheral IV - Single Lumen 11/19/17 2139 Left Forearm  less than 1 day         Peripheral IV - Single Lumen 11/19/17 2139 Right Forearm less than 1 day         Peripheral IV - Single Lumen 11/20/17 0143 Left Forearm less than 1 day                Significant Labs:    CBC/Anemia Profile:    Recent Labs  Lab 11/19/17 2200 11/20/17 0143 11/20/17 0413   WBC 7.21 9.90 11.38   HGB 15.6 12.1* 11.9*   HCT 48.5 38.0* 38.6*   PLT 83* 90* 93*   * 101* 103*   RDW 13.1 13.2 13.2        Chemistries:    Recent Labs  Lab 11/19/17 2200 11/20/17 0143 11/20/17 0413    144 141   K 3.6 4.3 4.6    112* 116*   CO2 14* 19* 11*   BUN 22 27* 28*   CREATININE 1.5* 1.6* 1.8*   CALCIUM 8.9 7.5* 7.3*   ALBUMIN 3.1* 2.2* 2.4*   PROT 6.7 4.3* 4.8*   BILITOT 0.5 0.3 0.3   ALKPHOS 188* 151* 175*   * 586* 572*   * 531* 668*   MG  --   --  2.0       ABGs:   Recent Labs  Lab 11/20/17 0424   PH 7.147*   PCO2 45.4*   HCO3 15.7*   POCSATURATED 94*   BE -13     Troponin:   Recent Labs  Lab 11/19/17 2200 11/20/17 0143   TROPONINI 0.106* 5.778*       Significant Imaging:   CXR: I have reviewed all pertinent results/findings within the past 24 hours and my personal findings are:  no focal process, pulmonary edema  Echo: I have reviewed all pertinent results/findings within the past 24 hours and my personal findings are:  current echo pending, last year HFrEF 35%  EKG: I have reviewed all pertinent results/findings within the past 24 hours and my personal findings are: ST segment elevation resolved

## 2017-11-20 NOTE — ED NOTES
2132 Pt arrived to ED transported by [facility] for reported witnessed cardia arrest.  PPV in progress upon arrival; intubated with Henry combitube.  Pt extubated and re-intubated per MD Zavala with 7.5 cm ett; secured at 25 cm at lip without difficulty.  Ett placement verified by [employer], BBS and Xray.  BBS coarse rhonci/rales.  Suctioned mouth and ett for copious jeannine secretions.  MD notified.  Manual PPV with 100% O2 continued via ambu until [facility] transport at 2241.

## 2017-11-20 NOTE — HOSPITAL COURSE
11/19 - cardiac arrest, presentation to [facility] ED post cardiac arrest in late evening  11/20 - admitted to [facility] ICU on max Levophed and Dopamine infusions with Fentanyl, Heparin and Precedex also infusing

## 2017-11-20 NOTE — PROGRESS NOTES
Pt was brought in per Ragini Ferris from [facility]. Pt was intubated with #7.5 OETT. Pt was placed on mechanical ventilator.

## 2017-11-20 NOTE — HPI
Mr. Huang is a 66 yo male with history of HTN,  DVT with PE in 2016, CAD with stents in 2010, CM, diabetes and 1/2 ppd smoker presented to [facility] ED via EMS after witnessed cardiac arrest with bystander CPR at home. EMS shocked twice with ROSC. Upon arrival to ED, found to be in PEA arrest, CPR resumed. Pulse returned at following pulse check.  History obtained from the EMR. STEMI in anterolateral leads. TNK for reperfusion. Transported to [facility] via [facility] for ongoing vent management. HPI and ROS are limited secondary to patient condition.

## 2017-11-20 NOTE — NURSING
[place]  home personnel here to obtain patient's body. Family present. Gave patient's watch to his daughter. No other personal belongings present.

## 2017-11-20 NOTE — CONSULTS
Consult Note  Cardiology    Consult Requested By: Lindy Smyth NP  Reason for Consult: Cardiac arrest. STEMI     SUBJECTIVE:     History of Present Illness:  Patient is a 67 y.o. male with PMH of HTN, tobacco use, recurrent PE/DVT. Family states that patient hasn't always been compliant with medical therapy.   who presented to [facility] ED after witnessed cardiac arrest. Bystander CPR initiated PTA. Found to be in V-fib arrest upon EMS arrival. Defib x 2. Upon arrival to the ED, patient pulselss and CPR initiated again. He was treated with Amio 300mg IV x1 with ROSC. Attending MD reported that post arrest, 12 lead EKG revealed NSR with ST elevation in leads V3-V6. He was treated with TPA, Heparin gtt, ASA and Plavix 300mg. Patient transferred to [place] for for further treatment.  Family reported that patient had similar cardiac arrest approximately.  1 year ago. Patient is intubated and sedated. Had failed attempt at extubation this morning. He has evidence of shock liver and metabolic acidosis. Is anuric this morning. Receiving IV bicarb. Is requiring  2 pressor support  for BP control. Remains on Heparin gtt. 2D echo shows Severely depressed left ventricular systolic function (EF 15-20%), WMA, RV enlargement with mild depressed systolic function, mild TR and trivial MR.         Review of patient's allergies indicates:   No Known Allergies    Past Medical History:   Diagnosis Date    Bilateral pulmonary embolism 2016    CAD S/P percutaneous coronary angioplasty 2010    Cardiomyopathy     Current smoker     DVT (deep venous thrombosis)     Hypertension      Past Surgical History:   Procedure Laterality Date    CORONARY ANGIOPLASTY WITH STENT PLACEMENT       Family History   Problem Relation Age of Onset    Deep vein thrombosis Mother     Cancer Mother      - 99    Deep vein thrombosis Brother     Deep vein thrombosis Sister     Diabetes Mellitus Sister     Hypertension Sister      Heart attacks under age 50 Father      - at 46     Social History   Substance Use Topics    Smoking status: Current Every Day Smoker     Packs/day: 0.50     Years: 45.00     Types: Cigarettes    Smokeless tobacco: Former User     Quit date: 2016    Alcohol use 0.0 oz/week      Comment: Drinks socially, maybe one drink a month        Review of Systems:  Unable to perform full ROS due to patient being intubated and sedated.     OBJECTIVE:     Vital Signs (Most Recent)  Temp: 97.5 °F (36.4 °C) (17 0700)  Pulse: 68 (17 1143)  Resp: (!) 0 (17 1143)  BP: (!) 82/30 (17 1135)  SpO2: (!) 92 % (17 0945)    Vital Signs Range (Last 24H):  Temp:  [97.4 °F (36.3 °C)-97.9 °F (36.6 °C)]   Pulse:  []   Resp:  [0-35]   BP: ()/(18-86)   SpO2:  [73 %-100 %]     Current Facility-Administered Medications   Medication    acetaminophen oral solution 650 mg    [START ON 2017] aspirin chewable tablet 81 mg    atorvastatin tablet 80 mg    bisacodyl suppository 10 mg    chlorhexidine 0.12 % solution 15 mL    clopidogrel tablet 75 mg    dextrose 50% injection 12.5 g    famotidine (PF) injection 20 mg    fentaNYL 2500 mcg in D5W 250 mL infusion premix (titrating) (conc: 10 mcg/mL)    glucagon (human recombinant) injection 1 mg    heparin (porcine) injection 3,000 Units    And    heparin 25,000 units in dextrose 5% 250 mL (100 units/mL) infusion (heparin infusion)    influenza (FLUZONE HIGH-DOSE) vaccine 0.5 mL    insulin aspart pen 1-10 Units    norepinephrine 32 mg in dextrose 5 % 250 mL infusion    ondansetron injection 4 mg    pneumoc 13-jovon conj-dip cr(PF) 0.5 mL    propofol (DIPRIVAN) 10 mg/mL infusion    sodium chloride 0.45% 1,000 mL with sodium bicarbonate 1 mEq/mL (8.4 %) 75 mEq infusion     No current facility-administered medications on file prior to encounter.      Current Outpatient Prescriptions on File Prior to Encounter   Medication Sig     atorvastatin (LIPITOR) 40 MG tablet     lisinopril (PRINIVIL,ZESTRIL) 2.5 MG tablet     rivaroxaban (XARELTO) 20 mg Tab Take 1 tablet (20 mg total) by mouth daily with dinner or evening meal.    [DISCONTINUED] metoprolol tartrate (LOPRESSOR) 25 MG tablet Take 1 tablet (25 mg total) by mouth 2 (two) times daily.       Physical Exam:  General appearance: intubated and sedated   Head: Normocephalic, without obvious abnormality, atraumatic  Neck: no adenopathy, no carotid bruit, no JVD, supple  Lungs:  Course  to auscultation bilaterally. Intubated   Chest wall: no tenderness  Heart: regular rate and rhythm, S1, S2 normal, no murmur, click, rub or gallop  Abdomen: soft, non-tender; bowel sounds normal; no masses,  no organomegaly  Extremities: extremities normal, atraumatic, no cyanosis or edema  Pulses: 2+ and symmetric  Skin: Skin color, texture, turgor normal. No rashes or lesions  Neurologic: Grossly normal    Laboratory:  Chemistry:   Lab Results   Component Value Date     11/20/2017    K 5.6 (H) 11/20/2017     (H) 11/20/2017    CO2 13 (L) 11/20/2017    BUN 31 (H) 11/20/2017    CREATININE 2.2 (H) 11/20/2017    CALCIUM 6.7 (LL) 11/20/2017     Cardiac Markers:   Lab Results   Component Value Date    TROPONINI 13.712 (H) 11/20/2017     Cardiac Markers (Last 3):   Lab Results   Component Value Date    TROPONINI 13.712 (H) 11/20/2017    TROPONINI 5.778 (H) 11/20/2017    TROPONINI 0.106 (H) 11/19/2017     CBC:   Lab Results   Component Value Date    WBC 11.38 11/20/2017    HGB 11.9 (L) 11/20/2017    HCT 38.6 (L) 11/20/2017     (H) 11/20/2017    PLT 93 (L) 11/20/2017     Lipids: No results found for: CHOL, TRIG, HDL, LDLDIRECT  Coagulation:   Lab Results   Component Value Date    INR 1.6 (H) 11/20/2017    APTT >150.0 (AA) 11/20/2017       Diagnostic Results:  ECG: Reviewed  X-Ray: Reviewed  Echo: Reviewed      ASSESSMENT/PLAN:     Patient Active Problem List   Diagnosis    History of recurrent  nonprovoked bilateral PE + DVT on chronic anticoagulation    Essential hypertension    Tobacco abuse    Deep venous thrombosis of both upper extremities    Cardiac arrest    Acute ST elevation myocardial infarction (STEMI)    ST elevation myocardial infarction involving left anterior descending (LAD) coronary artery    Heart failure, acute on chronic, systolic and diastolic    Acute hypercapnic respiratory failure    Shock liver    Hyperglycemia    Heart failure with reduced ejection fraction    Cardiogenic shock    Metabolic acidosis    SELENE (acute kidney injury)        Plan:   Patient is post V-fib arrest. Had evidence of ST elevation on EKG in leads V3-V6. Patient treated with TPA at transferring facility. He is currently requiring multiple pressure for pressure support. Is anuric, has shock liver and acidotic on labs. 2D echo shows LVF 15% with WMA. Recommend continuing current supportive care at this time. Continue ASA, Plavix and Statin. No BB or ACEI due to hypotension requiring pressors. No plans for angiogram at this moment given patient's current condition.     Chart reviewed. Patient examined by Dr. Matt and agrees with plan that has been outlined.

## 2017-11-20 NOTE — ED PROVIDER NOTES
Encounter Date: 2017       History     Chief Complaint   Patient presents with    Cardiac Arrest     [facility] after witnessed cardiac arrest.  Bystanders initiated CPR immediately.  When [facility] arrived, patient was found to be in Vfib, and was shocked twice with ROSC.  Upon arrival to the ED no pulse was felt, and CPR was continued.  300 mg of amiodarone was given. At next pulse check, cardiac motion was seen on Ultrasound and EKG was obtained.  HPI and ROS are limited secondary to patient condition.      The history is provided by the EMS personnel.     Review of patient's allergies indicates:  No Known Allergies  Past Medical History:   Diagnosis Date    Bilateral pulmonary embolism 2016    Current smoker     Hypertension      No past surgical history on file.  Family History   Problem Relation Age of Onset    Deep vein thrombosis Mother     Deep vein thrombosis Brother     Deep vein thrombosis Sister     Diabetes Mellitus Sister     Hypertension Sister     Heart attacks under age 50 Father      - at 46    Cancer Mother      - 99     Social History   Substance Use Topics    Smoking status: Current Every Day Smoker     Packs/day: 0.50     Years: 45.00     Types: Cigarettes    Smokeless tobacco: Former User     Quit date: 2016    Alcohol use 0.0 oz/week      Comment: Drinks socially, maybe one drink a month     Review of Systems   Unable to perform ROS: Acuity of condition       Physical Exam     Initial Vitals [17 2140]   BP Pulse Resp Temp SpO2   -- 90 -- -- --      MAP       --         Physical Exam    Nursing note and vitals reviewed.  Constitutional: He appears distressed.   Cachectic frail     HENT:   Head: Normocephalic and atraumatic.   Mouth/Throat: Oropharynx is clear and moist.   Henry combi tube in place   Eyes: Conjunctivae are normal.   Cardiovascular: Normal rate, regular rhythm and normal heart sounds. Exam reveals no gallop and no  friction rub.    No murmur heard.  Pulmonary/Chest: He is in respiratory distress. He has no wheezes. He has rhonchi. He has rales.   Abdominal: Soft. He exhibits no distension.   Musculoskeletal: He exhibits no edema.   Skin: No rash noted.   diaphoretic         ED Course   Critical Care  Date/Time: 11/19/2017 10:05 PM  Performed by: ARMANDO PEREZ  Authorized by: ARMANDO PEREZ   Direct patient critical care time: 15 minutes  Additional history critical care time: 10 minutes  Ordering / reviewing critical care time: 10 minutes  Documentation critical care time: 15 minutes  Consulting other physicians critical care time: 10 minutes  Total critical care time (exclusive of procedural time) : 60 minutes  Critical care was necessary to treat or prevent imminent or life-threatening deterioration of the following conditions: cardiac failure.    Intubation  Date/Time: 11/19/2017 9:36 PM  Performed by: ARMANDO PEREZ  Authorized by: ARMANDO PEREZ   Indications: airway protection and  respiratory failure  Intubation method: direct  Patient status: unconscious  Laryngoscope size: Mac 4  Tube size: 7.5 mm  Tube type: cuffed  Number of attempts: 1  Cords visualized: yes  Post-procedure assessment: chest rise and CO2 detector  Breath sounds: rales/crackles  Cuff inflated: yes  ETT to lip: 24 cm  Chest x-ray interpreted by radiologist.  Chest x-ray findings: endotracheal tube in appropriate position  Patient tolerance: Patient tolerated the procedure well with no immediate complications        Labs Reviewed   CBC W/ AUTO DIFFERENTIAL - Abnormal; Notable for the following:        Result Value     (*)     MCH 32.2 (*)     Platelets 83 (*)     MPV 13.2 (*)     Gran # 1.0 (*)     Lymph # 5.4 (*)     Gran% 17.3 (*)     Lymph% 74.9 (*)     All other components within normal limits   COMPREHENSIVE METABOLIC PANEL - Abnormal; Notable for the following:     CO2 14 (*)     Glucose 290 (*)     Creatinine 1.5 (*)      Albumin 3.1 (*)     Alkaline Phosphatase 188 (*)      (*)      (*)     Anion Gap 21 (*)     eGFR if  54.9 (*)     eGFR if non  47.5 (*)     All other components within normal limits   B-TYPE NATRIURETIC PEPTIDE - Abnormal; Notable for the following:      (*)     All other components within normal limits   TROPONIN I - Abnormal; Notable for the following:     Troponin I 0.106 (*)     All other components within normal limits   PROTIME-INR - Abnormal; Notable for the following:     Prothrombin Time 13.4 (*)     INR 1.3 (*)     All other components within normal limits   CK   APTT   URINALYSIS     EKG Readings: (Independently Interpreted)   Rhythm: Normal Sinus Rhythm. Ectopy: No Ectopy. Conduction: Normal. ST Segment Elevation: V3, V4, V5 and V6. Clinical Impression: Normal Sinus Rhythm and Lateral STEMI     ED Vital Signs:  Vitals:    11/19/17 2140 11/19/17 2146 11/19/17 2214 11/19/17 2223   BP:   101/73 109/63   Pulse: 90  85 67   Resp:   18    SpO2:   100% 100%   Weight:  66.9 kg (147 lb 7 oz)           Abnormal Lab Results:  Labs Reviewed   CBC W/ AUTO DIFFERENTIAL - Abnormal; Notable for the following:        Result Value     (*)     MCH 32.2 (*)     Platelets 83 (*)     MPV 13.2 (*)     Gran # 1.0 (*)     Lymph # 5.4 (*)     Gran% 17.3 (*)     Lymph% 74.9 (*)     All other components within normal limits   COMPREHENSIVE METABOLIC PANEL - Abnormal; Notable for the following:     CO2 14 (*)     Glucose 290 (*)     Creatinine 1.5 (*)     Albumin 3.1 (*)     Alkaline Phosphatase 188 (*)      (*)      (*)     Anion Gap 21 (*)     eGFR if  54.9 (*)     eGFR if non  47.5 (*)     All other components within normal limits   B-TYPE NATRIURETIC PEPTIDE - Abnormal; Notable for the following:      (*)     All other components within normal limits   TROPONIN I - Abnormal; Notable for the following:     Troponin I  0.106 (*)     All other components within normal limits   PROTIME-INR - Abnormal; Notable for the following:     Prothrombin Time 13.4 (*)     INR 1.3 (*)     All other components within normal limits   CK   APTT   URINALYSIS          All Lab Results:  Results for orders placed or performed during the hospital encounter of 11/19/17   CBC auto differential   Result Value Ref Range    WBC 7.21 3.90 - 12.70 K/uL    RBC 4.84 4.60 - 6.20 M/uL    Hemoglobin 15.6 14.0 - 18.0 g/dL    Hematocrit 48.5 40.0 - 54.0 %     (H) 82 - 98 fL    MCH 32.2 (H) 27.0 - 31.0 pg    MCHC 32.2 32.0 - 36.0 g/dL    RDW 13.1 11.5 - 14.5 %    Platelets 83 (L) 150 - 350 K/uL    MPV 13.2 (H) 9.2 - 12.9 fL    Gran # 1.0 (L) 1.8 - 7.7 K/uL    Lymph # 5.4 (H) 1.0 - 4.8 K/uL    Mono # 0.3 0.3 - 1.0 K/uL    Eos # 0.2 0.0 - 0.5 K/uL    Baso # 0.05 0.00 - 0.20 K/uL    Gran% 17.3 (L) 38.0 - 73.0 %    Lymph% 74.9 (H) 18.0 - 48.0 %    Mono% 4.6 4.0 - 15.0 %    Eosinophil% 2.5 0.0 - 8.0 %    Basophil% 0.7 0.0 - 1.9 %    Differential Method Automated    Comprehensive metabolic panel   Result Value Ref Range    Sodium 143 136 - 145 mmol/L    Potassium 3.6 3.5 - 5.1 mmol/L    Chloride 108 95 - 110 mmol/L    CO2 14 (L) 23 - 29 mmol/L    Glucose 290 (H) 70 - 110 mg/dL    BUN, Bld 22 8 - 23 mg/dL    Creatinine 1.5 (H) 0.5 - 1.4 mg/dL    Calcium 8.9 8.7 - 10.5 mg/dL    Total Protein 6.7 6.0 - 8.4 g/dL    Albumin 3.1 (L) 3.5 - 5.2 g/dL    Total Bilirubin 0.5 0.1 - 1.0 mg/dL    Alkaline Phosphatase 188 (H) 55 - 135 U/L     (H) 10 - 40 U/L     (H) 10 - 44 U/L    Anion Gap 21 (H) 8 - 16 mmol/L    eGFR if African American 54.9 (A) >60 mL/min/1.73 m^2    eGFR if non  47.5 (A) >60 mL/min/1.73 m^2   Brain natriuretic peptide   Result Value Ref Range     (H) 0 - 99 pg/mL   CK   Result Value Ref Range     20 - 200 U/L   Troponin I   Result Value Ref Range    Troponin I 0.106 (H) 0.000 - 0.026 ng/mL   APTT   Result Value Ref  Range    aPTT 26.4 21.0 - 32.0 sec   Protime-INR   Result Value Ref Range    Prothrombin Time 13.4 (H) 9.0 - 12.5 sec    INR 1.3 (H) 0.8 - 1.2           Imaging Results:  Imaging Results          X-Ray Chest AP Portable (Final result)  Result time 11/19/17 21:52:56    Final result by Wendy Ravi MD (11/19/17 21:52:56)                 Impression:      There is patchy consolidation bilaterally greatest in the right upper lobe.  An endotracheal tube is present with its tip just above the precious.      Electronically signed by: WENDY RAVI MD  Date:     11/19/17  Time:    21:52              Narrative:    Exam: XR CHEST AP PORTABLE,    Date:  11/19/17 21:42:19    History: Shortness of breath    Comparison:  2/ 23/ 2016.    Findings: An endotracheal tube is present with its tip at the level of T6 just above the precious.  There is patchy consolidation in the lungs bilaterally consistent right upper lobe.  No pneumothorax is demonstrated.  The heart is normal in size.                                 The Emergency Provider reviewed the vital signs and test results, which are outlined above.    ED Discussions:  9:43 PM Case discussed with Dr. Riggins (cardiology) recommends giving tnkase, heparin and aspirin and transfer to [facility]    9:45 PM - Case discussed with Devaughn (Charge nurse) and Dr. Friedman (Ed physician) at [facility].  Updated about the patient.       All historical, clinical, radiographic, and laboratory findings were reviewed with the family in detail along with the indications for transport to the facility in [place] in order to receive a left heart cath.  All remaining questions and concerns were addressed at this time and the family communicates understanding and agrees to proceed accordingly.  Similarly, all pertinent details of the encounter were discussed with Dr. Riggins and Dr. Friedman who agrees to receive the patient at [facility] for further care as outlined  above.  The patient will be transferred by [facility] secondary to a need for ongoing cardiac monitoring, IV fluids, and vent management en route.  Ajith Zavala MD  10:14 PM                                   ED Course      Clinical Impression:       ICD-10-CM ICD-9-CM   1. ST elevation myocardial infarction involving left anterior descending (LAD) coronary artery I21.02 410.10   2. Cardiac arrest I46.9 427.5   3. Acute MI I21.9 410.90         Disposition:   Disposition: Transferred  Condition: Critical                        Ajith Zavala MD  11/19/17 1857

## 2017-11-20 NOTE — HPI
History obtained from medical record due to patient's clinical condition.  Mr. Huang is a 68yo male with a PMHx of HTN, CAD with remote PCI to unknown vessel in 2010, ischemic CMPY (EF 35% per echo 2/2016), h/o TIA, tobacco use, medication noncompliance, family h/o premature CAD, and recurrent nonprovoked PE/DVT, who presented to [facility] ED in [facility] per EMS after witnessed cardiac arrest.  Bystanders initiated CPR immediately.  When [facility] arrived, patient was found to be in V-Fib, and was shocked twice with ROSC.  Upon arrival to the ED, patient was pulseless and CPR initiated.  IV amio 300 mg x 1 dose given with ROSC. Patient was intubated.  According to [facility] ED note, post cardiac arrest EKG revealed NSR with ST elevations in leads V3-V6 (no EKG available in chart for review).  Cardiology was consulted in ED, and recommended tPA, heparin drip, and ASA which were given per [facility] ED.  Subsequently, patient was transferred to [facility] for Cardiology services.  Hospital Medicine was consulted for ICU admission.  Currently, patient is sedated and intubated.  He remains hemodynamically stable without pressors.

## 2017-11-20 NOTE — PROGRESS NOTES
Pt extubated at this time to NC 2lpm, no complications noted, pt does have old bloody thin oral secretions.

## 2017-11-20 NOTE — SUBJECTIVE & OBJECTIVE
Past Medical History:   Diagnosis Date    Bilateral pulmonary embolism 2/23/2016    Current smoker     Hypertension        History reviewed. No pertinent surgical history.    Review of patient's allergies indicates:  No Known Allergies    No current facility-administered medications on file prior to encounter.      Current Outpatient Prescriptions on File Prior to Encounter   Medication Sig    atorvastatin (LIPITOR) 40 MG tablet     lisinopril (PRINIVIL,ZESTRIL) 2.5 MG tablet     metoprolol tartrate (LOPRESSOR) 25 MG tablet Take 1 tablet (25 mg total) by mouth 2 (two) times daily.    rivaroxaban (XARELTO) 20 mg Tab Take 1 tablet (20 mg total) by mouth daily with dinner or evening meal.     Family History     Problem Relation (Age of Onset)    Cancer Mother    Deep vein thrombosis Mother, Brother, Sister    Diabetes Mellitus Sister    Heart attacks under age 50 Father    Hypertension Sister        Social History Main Topics    Smoking status: Current Every Day Smoker     Packs/day: 0.50     Years: 45.00     Types: Cigarettes    Smokeless tobacco: Former User     Quit date: 2/23/2016    Alcohol use 0.0 oz/week      Comment: Drinks socially, maybe one drink a month    Drug use: No    Sexual activity: Yes     Review of Systems   Unable to perform ROS: Intubated     Objective:     Vital Signs (Most Recent):  Temp: 97.9 °F (36.6 °C) (11/19/17 2144)  Pulse: 86 (Simultaneous filing. User may not have seen previous data.) (11/19/17 2342)  Resp: 18 (11/19/17 2342)  BP: 95/63 (11/19/17 2342)  SpO2: (!) 94 % (11/19/17 2342) Vital Signs (24h Range):  Temp:  [97.9 °F (36.6 °C)] 97.9 °F (36.6 °C)  Pulse:  [49-93] 86  Resp:  [17-25] 18  SpO2:  [93 %-100 %] 94 %  BP: ()/(56-78) 95/63     Weight: 66.9 kg (147 lb 7 oz)  Body mass index is 21.77 kg/m².    Physical Exam   Constitutional: He appears cachectic. He is intubated.   HENT:   Head: Normocephalic and atraumatic.   Right Ear: External ear normal.   Left Ear:  External ear normal.   Dry blood of mouth    Neck: Neck supple.   Cardiovascular: Normal rate, regular rhythm, normal heart sounds and intact distal pulses.    Pulmonary/Chest: He is intubated. He has rhonchi.   Abdominal: Soft. Bowel sounds are absent.   Genitourinary: Penis normal.   Neurological: He is unresponsive.   Skin:   Cool and dry   Psychiatric:   unresponsive   Nursing note and vitals reviewed.       Significant Labs:   Results for orders placed or performed during the hospital encounter of 11/19/17   CBC auto differential   Result Value Ref Range    WBC 7.21 3.90 - 12.70 K/uL    RBC 4.84 4.60 - 6.20 M/uL    Hemoglobin 15.6 14.0 - 18.0 g/dL    Hematocrit 48.5 40.0 - 54.0 %     (H) 82 - 98 fL    MCH 32.2 (H) 27.0 - 31.0 pg    MCHC 32.2 32.0 - 36.0 g/dL    RDW 13.1 11.5 - 14.5 %    Platelets 83 (L) 150 - 350 K/uL    MPV 13.2 (H) 9.2 - 12.9 fL    Gran # 1.0 (L) 1.8 - 7.7 K/uL    Lymph # 5.4 (H) 1.0 - 4.8 K/uL    Mono # 0.3 0.3 - 1.0 K/uL    Eos # 0.2 0.0 - 0.5 K/uL    Baso # 0.05 0.00 - 0.20 K/uL    Gran% 17.3 (L) 38.0 - 73.0 %    Lymph% 74.9 (H) 18.0 - 48.0 %    Mono% 4.6 4.0 - 15.0 %    Eosinophil% 2.5 0.0 - 8.0 %    Basophil% 0.7 0.0 - 1.9 %    Differential Method Automated    Comprehensive metabolic panel   Result Value Ref Range    Sodium 143 136 - 145 mmol/L    Potassium 3.6 3.5 - 5.1 mmol/L    Chloride 108 95 - 110 mmol/L    CO2 14 (L) 23 - 29 mmol/L    Glucose 290 (H) 70 - 110 mg/dL    BUN, Bld 22 8 - 23 mg/dL    Creatinine 1.5 (H) 0.5 - 1.4 mg/dL    Calcium 8.9 8.7 - 10.5 mg/dL    Total Protein 6.7 6.0 - 8.4 g/dL    Albumin 3.1 (L) 3.5 - 5.2 g/dL    Total Bilirubin 0.5 0.1 - 1.0 mg/dL    Alkaline Phosphatase 188 (H) 55 - 135 U/L     (H) 10 - 40 U/L     (H) 10 - 44 U/L    Anion Gap 21 (H) 8 - 16 mmol/L    eGFR if African American 54.9 (A) >60 mL/min/1.73 m^2    eGFR if non  47.5 (A) >60 mL/min/1.73 m^2   Brain natriuretic peptide   Result Value Ref Range    BNP  101 (H) 0 - 99 pg/mL   CK   Result Value Ref Range     20 - 200 U/L   Troponin I   Result Value Ref Range    Troponin I 0.106 (H) 0.000 - 0.026 ng/mL   APTT   Result Value Ref Range    aPTT 26.4 21.0 - 32.0 sec   Protime-INR   Result Value Ref Range    Prothrombin Time 13.4 (H) 9.0 - 12.5 sec    INR 1.3 (H) 0.8 - 1.2   ISTAT PROCEDURE   Result Value Ref Range    POC PH 7.177 (LL) 7.35 - 7.45    POC PCO2 47.7 (H) 35 - 45 mmHg    POC PO2 57 (LL) 80 - 100 mmHg    POC HCO3 17.7 (L) 24 - 28 mmol/L    POC BE -11 -2 to 2 mmol/L    POC SATURATED O2 81 (L) 95 - 100 %    Rate 16     Sample ARTERIAL     Site RR     Allens Test Pass     DelSys Adult Vent     Mode AC/PRVC     Vt 400     PEEP 5     FiO2 50       All pertinent labs within the past 24 hours have been reviewed.    Significant Imaging:   Imaging Results          X-Ray Chest 1 View (In process)                X-Ray Chest AP Portable (Final result)  Result time 11/19/17 21:52:56    Final result by Wendy Ravi MD (11/19/17 21:52:56)                 Impression:      There is patchy consolidation bilaterally greatest in the right upper lobe.  An endotracheal tube is present with its tip just above the precious.      Electronically signed by: WENDY RAVI MD  Date:     11/19/17  Time:    21:52              Narrative:    Exam: XR CHEST AP PORTABLE,    Date:  11/19/17 21:42:19    History: Shortness of breath    Comparison:  2/ 23/ 2016.    Findings: An endotracheal tube is present with its tip at the level of T6 just above the precious.  There is patchy consolidation in the lungs bilaterally consistent right upper lobe.  No pneumothorax is demonstrated.  The heart is normal in size.                             I have reviewed all pertinent imaging results/findings within the past 24 hours.

## 2017-11-20 NOTE — ASSESSMENT & PLAN NOTE
-  Admission last year for DVT/PE  - On Xarelto as home med, held for now.   - Post TNK. On heparin infusion  - daily CBC and PT/INR

## 2017-11-20 NOTE — PLAN OF CARE
Problem: Patient Care Overview  Goal: Plan of Care Review  Outcome: Ongoing (interventions implemented as appropriate)  Recommendations     Recommendation/Intervention: 1.Consider tube feed for nutrition support until extubated: Novasource at 35 ml/hr with 1, with flushes as needed per MD or NP for hydration. Provides 1680 calories, 76 g protein, 602 ml water. 2. Hold for residuals >500 mL. 3.Will continue to monitor.   Goals: Meet 85- 100 % of estimated nutritional needs without s/s of GI distress   Nutrition Goal Status: new  Communication of RD Recs: discussed on rounds

## 2017-11-20 NOTE — ASSESSMENT & PLAN NOTE
Patient will be medically treated for now given he had witnessed cardiac arrest.  He is on heparin, IV pressors and was given ASA/Plavix/Tenecteplase in the ED and cardiology will see

## 2017-11-20 NOTE — PLAN OF CARE
Patient      17 1511   Final Note   Assessment Type Final Discharge Note   Discharge Disposition    Hospital Follow Up  Appt(s) scheduled? No   Discharge plans and expectations educations in teach back method with documentation complete? No   Right Care Referral Info   Post Acute Recommendation No Care

## 2017-11-20 NOTE — DISCHARGE SUMMARY
Death Summary  Hospital Medicine    Admit Date: 2017    Time of deah: 2017 1510    Discharge Attending Physician: Melissa Mcdonough MD     Cause of death: PEA Cardic Arrest    Diagnoses:  Active Hospital Problems    Diagnosis  POA    *Cardiogenic shock [R57.0]  Yes    Cardiac arrest [I46.9]  Yes    Acute ST elevation myocardial infarction (STEMI) [I21.3]  Yes    ST elevation myocardial infarction involving left anterior descending (LAD) coronary artery [I21.02]  Yes    Heart failure, acute on chronic, systolic and diastolic [I50.43]  Yes    Acute hypercapnic respiratory failure [J96.02]  Yes     Post cardiac arrest      Shock liver [K72.00]  Yes     Post cardiac arrest      Hyperglycemia [R73.9]  Yes    Heart failure with reduced ejection fraction [I50.20]  Yes    Metabolic acidosis [E87.2]  Unknown    SELENE (acute kidney injury) [N17.9]  Unknown    Essential hypertension [I10]  Yes     Chronic    Tobacco abuse [Z72.0]  Yes     Chronic    History of recurrent nonprovoked bilateral PE + DVT on chronic anticoagulation [I26.99]  Yes     Chronic      Resolved Hospital Problems    Diagnosis Date Resolved POA    Metabolic acidosis with respiratory acidosis [E87.4] 2017 Yes    Transaminitis [R74.0] 2017 Yes     Post cardiac arrest         Hospital Course: Called to bedside. Patient unresponsive, pupils nonreactive, no pulses, spontaneous breathing or responses to pain. No heart sounds or breath sounds heard apart from agonal breathing which did cease. Patient pronounced dead at 1510. Family at bedside.    Discharged Condition:

## 2017-11-20 NOTE — PROCEDURES
"Pawan Huang is a 67 y.o. male patient.    Temp: 97.5 °F (36.4 °C) (11/20/17 0700)  Pulse: 68 (11/20/17 1143)  Resp: (!) 0 (11/20/17 1143)  BP: (!) 82/30 (11/20/17 1135)  SpO2: (!) 92 % (11/20/17 0945)  Weight: 61.4 kg (135 lb 5.8 oz) (11/20/17 0315)  Height: 6' 2" (188 cm) (11/20/17 0315)       Central Line  Date/Time: 11/20/2017 12:30 PM  Location procedure was performed: [facility] INTENSIVE CARE UNIT  Performed by: EDER GAMBOA  Pre-operative Diagnosis: shock  Post-operative diagnosis: cardiogenic shock  Consent Done: Yes  Time out: Immediately prior to procedure a "time out" was called to verify the correct patient, procedure, equipment, support staff and site/side marked as required.  Indications: med administration and vascular access  Anesthesia: local infiltration    Anesthesia:  Local Anesthetic: lidocaine 1% without epinephrine  Anesthetic total: 2 mL  Preparation: skin prepped with ChloraPrep  Skin prep agent dried: skin prep agent completely dried prior to procedure  Sterile barriers: all five maximum sterile barriers used - cap, mask, sterile gown, sterile gloves, and large sterile sheet  Hand hygiene: hand hygiene performed prior to central venous catheter insertion  Location details: right femoral  Site selection rationale: s/p tPA and on Heparin infusion at time of insertion  Catheter type: triple lumen  Catheter size: 7 Fr  Catheter Length: 20cm    Ultrasound guidance: yes  Vessel Caliber: medium, patent, compressibility normal  Vascular Doppler: not done  Needle advanced into vessel with real time Ultrasound guidance.  Guidewire confirmed in vessel.  Sterile sheath used.  Manometry: No   Number of attempts: 1  Assessment: successful placement  Complications: none  Estimated blood loss (mL): 0  Specimens: No  Implants: No  Post-procedure: line sutured,  chlorhexidine patch,  sterile dressing applied and blood return through all ports  Complications: No          Eder Gamboa  11/20/2017  "

## 2017-11-20 NOTE — PROGRESS NOTES
CPR started at this time, 3 rounds of Epi given, pt heartbeat back at 1432, Joe Gamboa spoke to family and they want comfort measures.

## 2017-11-20 NOTE — PLAN OF CARE
The youngest dtr arrived for the patient. She called her sister. They were are at [facility] and are on their way back. CM explained the patient is not doing wll at this time. She stated they will be here in a few minutes. .                                                                                                                                                                                                                                                                                                                                                                                                                                                                                                                                                ................................[id number]

## 2017-11-20 NOTE — EICU
Called into bedside to remote document/assist with code blue proceedings. Mr. Huang was found to be in a rapidly deteriorating state then suddenly into PEA.  A code blue was called and the ACLS PEA Arrest algorithm was initiated as follows. ROSC was achieved after 8 minutes and 25 seconds with 3 rounds of epinephrine and 2 amps of NaHCO3.      Time Event Details User             14:24 Code Start INITIAL RHYTHM: PEA KS     14:24 Info Prior To Code Info Prior To Code - Code Location: Inpatient Rapid Response Team: Responded To Code (ICU Staff) Witnessed: Yes Event Type: Cardiac Conscious at Onset: No Pulse Present at Onset: No Respirations Present at Onset: No Ventilated Pre-Arrest: Yes Pre-Arrest Vent/Sp02/A-B/ECG: Vent; Pulse Ox; ECG KS     14:24 Staff Arrived Haider Gamboa NP; Aneta Gilliland, ESVIN; Malina Walls RN; Sonido Gallo RN KS     14:24 Condition on Arrival Code Condition on Arrival in ED Code - Code Location: Inpatient Pulses Present: No Resp Present: On Ventilator Mental Status: Unresponsive Cardiac Rhythm: PEA KS     14:24 Rhythms - ABCs Cardiac Rhythm - Ventricular Rhythm: PEA (pulseless electrical activity)  ABCs - Airway: Clear Breathing: Absent Pulses: Absent KS     14:24 Interventions Interventions - Compressions: Started Airway: Intubated (Already intubated prior to code) Oxygen Percent (% O2): 100 KS     14:24 Orders Completed Insert arterial line GG     14:25 Data Record Vitals - BP: 180/103 (Device Time: 14:26:29) Pulse: 41 (Device Time: 14:25:29) Resp: 27 (Device Time: 14:25:29) CN     14:25 Rhythms - ABCs Cardiac Rhythm - Pulse: 41 (Device Time: 14:25:29) CN     14:25 Oxygen Therapy Oxygen Therapy - Oxygen Concentration (%): 60 (Device Time: 14:24:43) CN     14:25 Vitals Vitals - Pulse: 41 (Device Time: 14:25:29) Resp: 27 (Device Time: 14:25:29) BP: 180/103 (Device Time: 14:26:29) CN     14:25 Cardiac (CPM) ECG - Pulse: 41 (Device Time: 14:25:29) CN     14:25 EPINEPHrine  injection Ordered and Given Dose: 1 mg Route: Intravenous  Line:  Peripheral IV - Single Lumen 11/19/17 2139 Right Forearm  Ordered by: Haider Gamboa NP DH     14:25 Orders Completed INTUBATION GG     14:25 Orders Completed Insert arterial line GG     14:26 Rhythms - ABCs Cardiac Rhythm - Ventricular Rhythm: PEA (pulseless electrical activity)  ABCs - Airway: Clear Breathing: Agonal Pulses: Absent KS     14:26 Interventions Interventions - Compressions: CPR in Progress Airway: Intubated Oxygen Percent (% O2): 100 KS     14:26 Orders Completed CENTRAL LINE GG     14:26 sodium bicarbonate 8.4 % (1 mEq/mL) injection Ordered and Given Dose: 50 mEq Route: Intravenous  Line:  Peripheral IV - Single Lumen 11/19/17 2139 Right Forearm  Ordered by: Haider Gamboa NP DH     14:28 Rhythms - ABCs Cardiac Rhythm - Ventricular Rhythm: PEA (pulseless electrical activity)  ABCs - Airway: Clear Breathing: Agonal Pulses: Absent KS     14:28 Interventions Interventions - Compressions: CPR in Progress Oxygen Percent (% O2): 100 KS     14:28 EPINEPHrine injection Ordered and Given Dose: 1 mg Route: Intravenous  Line:  Peripheral IV - Single Lumen 11/19/17 2139 Right Forearm  Ordered by: Haider Gamboa NP DH     14:29 sodium bicarbonate 8.4 % (1 mEq/mL) injection Ordered and Given Dose: 50 mEq Route: Intravenous  Line:  Peripheral IV - Single Lumen 11/19/17 2139 Right Forearm  Ordered by: Haider Gamboa NP DH     14:30 Rhythms - ABCs Cardiac Rhythm - Ventricular Rhythm: PEA (pulseless electrical activity)  ABCs - Airway: Clear Breathing: Agonal Pulses: Absent KS     14:30 Interventions Interventions - Compressions: CPR in Progress Airway: Intubated Oxygen Percent (% O2): 100 KS     14:32 EPINEPHrine injection Ordered and Given Dose: 1 mg Route: Intravenous  Line:  Peripheral IV - Single Lumen 11/19/17 2139 Right Forearm  Ordered by: Haider Gamboa NP DH     14:32 Vitals Vitals - Pulse: 119 Heart Rate Source: Monitor Resp:  26 BP: 120/56 Patient Position: Lying KS     14:32 Pain Scale Number Scale - Pain Rating: Activity: 0 KS     14:32 Notifications Notifications - Notified: Family KS     14:32 Interventions Interventions - Compressions: Stopped (Due to ROSC) Airway: Intubated Oxygen Percent (% O2): 100 KS     14:32 Rhythms - ABCs Cardiac Rhythm - Junctional Rhythm: accelerated junctional rhythm  ABCs - Airway: Clear Breathing: Agonal Pulses: Carotid Present; Femoral Present KS     14:32 Code Outcome Outcome - Survival: Yes Code Termination Due to: Return of Spontaneous Circulation Transfer To Critical Care: Other (Comment) (Already in critical care.) KS     14:33 Oxygen Therapy Oxygen Therapy - Oxygen Concentration (%): 100 O2 Device (Oxygen Therapy): ventilator KS     14:33 Code End CODE END DUE TO ROSC KS     14:33 Staff Departed Haider Gamboa NP (Automatically marked out by Code End event); Aneta Gilliland RN (Automatically marked out by Code End event); Malina Walls RN (Automatically marked out by Code End event); Sonido Gallo RN (Automatically marked out by Code End event) KS        Results for YESY RUIZ (MRN [id number]) as of 11/20/2017 15:23   Ref. Range 11/20/2017 01:43 11/20/2017 04:13   WBC Latest Ref Range: 3.90 - 12.70 K/uL 9.90 11.38   RBC Latest Ref Range: 4.60 - 6.20 M/uL 3.76 (L) 3.75 (L)   Hemoglobin Latest Ref Range: 14.0 - 18.0 g/dL 12.1 (L) 11.9 (L)   Hematocrit Latest Ref Range: 40.0 - 54.0 % 38.0 (L) 38.6 (L)   MCV Latest Ref Range: 82 - 98 fL 101 (H) 103 (H)   MCH Latest Ref Range: 27.0 - 31.0 pg 32.2 (H) 31.7 (H)   MCHC Latest Ref Range: 32.0 - 36.0 g/dL 31.8 (L) 30.8 (L)   RDW Latest Ref Range: 11.5 - 14.5 % 13.2 13.2   Platelets Latest Ref Range: 150 - 350 K/uL 90 (L) 93 (L)   MPV Latest Ref Range: 9.2 - 12.9 fL 12.7 13.4 (H)   Gran% Latest Ref Range: 38.0 - 73.0 % 72.8 83.5 (H)   Gran # Latest Ref Range: 1.8 - 7.7 K/uL 7.2 9.5 (H)   Lymph% Latest Ref Range: 18.0 - 48.0 % 19.3 9.3  (L)   Lymph # Latest Ref Range: 1.0 - 4.8 K/uL 1.9 1.1   Mono% Latest Ref Range: 4.0 - 15.0 % 7.1 7.2   Mono # Latest Ref Range: 0.3 - 1.0 K/uL 0.7 0.8   Eosinophil% Latest Ref Range: 0.0 - 8.0 % 0.6 0.2   Eos # Latest Ref Range: 0.0 - 0.5 K/uL 0.1 0.0   Basophil% Latest Ref Range: 0.0 - 1.9 % 0.2 0.2   Baso # Latest Ref Range: 0.00 - 0.20 K/uL 0.02 0.02   Protime Latest Ref Range: 9.0 - 12.5 sec 15.9 (H)    Coumadin Monitoring INR Latest Ref Range: 0.8 - 1.2  1.6 (H)    aPTT Latest Ref Range: 21.0 - 32.0 sec >150.0 (AA)      Results for YESY RUIZ (MRN [id number]) as of 11/20/2017 15:23   Ref. Range 11/20/2017 01:43 11/20/2017 04:13 11/20/2017 08:47   Sodium Latest Ref Range: 136 - 145 mmol/L 144 141 142   Potassium Latest Ref Range: 3.5 - 5.1 mmol/L 4.3 4.6 5.6 (H)   Chloride Latest Ref Range: 95 - 110 mmol/L 112 (H) 116 (H) 114 (H)   CO2 Latest Ref Range: 23 - 29 mmol/L 19 (L) 11 (L) 13 (L)   Anion Gap Latest Ref Range: 8 - 16 mmol/L 13 14 15   BUN, Bld Latest Ref Range: 8 - 23 mg/dL 27 (H) 28 (H) 31 (H)   Creatinine Latest Ref Range: 0.5 - 1.4 mg/dL 1.6 (H) 1.8 (H) 2.2 (H)   eGFR if non African American Latest Ref Range: >60 mL/min/1.73 m^2 44 (A) 38 (A) 30 (A)   eGFR if African American Latest Ref Range: >60 mL/min/1.73 m^2 51 (A) 44 (A) 35 (A)   Glucose Latest Ref Range: 70 - 110 mg/dL 325 (H) 331 (H) 424 (H)   Calcium Latest Ref Range: 8.7 - 10.5 mg/dL 7.5 (L) 7.3 (L) 6.7 (LL)   Magnesium Latest Ref Range: 1.6 - 2.6 mg/dL  2.0    Alkaline Phosphatase Latest Ref Range: 55 - 135 U/L 151 (H) 175 (H)    Total Protein Latest Ref Range: 6.0 - 8.4 g/dL 4.3 (L) 4.8 (L)    Albumin Latest Ref Range: 3.5 - 5.2 g/dL 2.2 (L) 2.4 (L)    Total Bilirubin Latest Ref Range: 0.1 - 1.0 mg/dL 0.3 0.3    AST Latest Ref Range: 10 - 40 U/L 531 (H) 668 (H)    ALT Latest Ref Range: 10 - 44 U/L 586 (H) 572 (H)    Troponin I Latest Ref Range: 0.000 - 0.026 ng/mL 5.778 (H)  13.712 (H)   TSH Latest Ref Range: 0.400 - 4.000 uIU/mL  0.089  (L)    Free T4 Latest Ref Range: 0.71 - 1.51 ng/dL  0.87      Results for YESY RUIZ (MRN [id number]) as of 11/20/2017 15:23   Ref. Range 11/20/2017 02:44   Specimen UA Unknown Urine, Catheterized   Color, UA Latest Ref Range: Yellow, Straw, [place]  Yellow   pH, UA Latest Ref Range: 5.0 - 8.0  5.0   Specific Gravity, UA Latest Ref Range: 1.005 - 1.030  1.030   Appearance, UA Latest Ref Range: Clear  Cloudy (A)   Protein, UA Latest Ref Range: Negative  3+ (A)   Glucose, UA Latest Ref Range: Negative  Negative   Ketones, UA Latest Ref Range: Negative  Trace (A)   Occult Blood UA Latest Ref Range: Negative  3+ (A)   Nitrite, UA Latest Ref Range: Negative  Negative   Urobilinogen, UA Latest Ref Range: <2.0 EU/dL Negative   Bilirubin (UA) Latest Ref Range: Negative  Negative   Leukocytes, UA Latest Ref Range: Negative  Negative   RBC, UA Latest Ref Range: 0 - 4 /hpf 10 (H)   WBC, UA Latest Ref Range: 0 - 5 /hpf 2   Bacteria, UA Latest Ref Range: None-Occ /hpf Few (A)   Squam Epithel, UA Latest Units: /hpf 1   Hyaline Casts, UA Latest Ref Range: 0-1/lpf /lpf 0   Amorphous, UA Latest Ref Range: None-Moderate  Few   Microscopic Comment Unknown see comment     Results for YESY RUIZ (MRN [id number]) as of 11/20/2017 15:23   Ref. Range 11/20/2017 01:14 11/20/2017 02:44 11/20/2017 04:24 11/20/2017 08:29 11/20/2017 12:08   POCT Glucose Latest Ref Range: 70 - 110 mg/dL    426 (H) 305 (H)   POC PH Latest Ref Range: 7.35 - 7.45  7.195 (LL)  7.147 (LL)     POC PCO2 Latest Ref Range: 35 - 45 mmHg 50.5 (H)  45.4 (H)     POC PO2 Latest Ref Range: 80 - 100 mmHg 320 (H)  90     POC BE Latest Ref Range: -2 to 2 mmol/L -9  -13     POC HCO3 Latest Ref Range: 24 - 28 mmol/L 19.5 (L)  15.7 (L)     POC SATURATED O2 Latest Ref Range: 95 - 100 % 100  94 (L)     FiO2 Unknown 100  65     Vt Unknown 400  400     PEEP Unknown   0     Sample Unknown ARTERIAL  ARTERIAL     DelSys Unknown Adult Vent  Adult Vent     Allens Test Unknown Pass  Pass      Site Unknown RR  RR     Mode Unknown AC/PRVC  AC/PRVC     Specific [place], UA Latest Ref Range: 1.005 - 1.030   1.030      Protein, UA Latest Ref Range: Negative   3+ (A)      Urobilinogen, UA Latest Ref Range: <2.0 EU/dL  Negative      Leukocytes, UA Latest Ref Range: Negative   Negative      Color, UA Latest Ref Range: Yellow, Straw, [place]   Yellow      Nitrite, UA Latest Ref Range: Negative   Negative        EXAM:   XR CHEST AP PORTABLE    CLINICAL HISTORY:  ET tube placement evaluation    COMPARISON:  11/19/2017    FINDINGS:   Heart size is normal. Endotracheal tube in place with the tip above the precious, projecting at the T4 vertebral body level (as compared to the T2-T3 level before).  Nasogastric tube in place, the tip not visualized projecting below the diaphragm.  Cardiac monitor leads are present.    Surgical staple line in the left lung base.  No dense alveolar infiltrates on the left.  Indication of some atelectasis or scarring in the left lung base.    Right perihilar and right upper lobe infiltrates, similar to before.  No pneumothorax.   Impression        1.  Endotracheal tube and nasogastric tube positioning, appearing satisfactory, as above.  2.  Right perihilar right upper lobe alveolar type infiltrates.  3.  Surgical staple line in the left lung base.  Indication is some atelectasis and/or scarring in the left lung base      Electronically signed by: AMINAH MORALES MD  Date: 11/20/17  Time: 09:53        History: Orogastric tube placement    Comparison: None    Result: Two view abdomen. Orogastric tube projects just beyond the GE junction and could be advanced approximately 9 cm. Severe gastric distention is noted. No pneumatosis is seen. Mildly prominent loops of small bowel are noted without definite transition. Small bowel loops measure up to 2.9 cm.    No radiopaque kidney calculus is identified.  There are multiple calcifications in the pelvis most consistent with phleboliths.  No evidence  of organomegaly.  The bones demonstrate mild degenerative changes within the lower lumbar region.  The bones are otherwise intact.   Impression      Orogastric tube projects just beyond the GE junction and could be advanced approximately 9 cm.     Severe gastric distention is noted.       Electronically signed by: CHUY AYALA MD  Date: 11/20/17  Time: 08:04        Clinical Data:OG tube    Comparison:  none    Findings:  Single view of the chest.      Cardiac silhouette is normal.  Infiltrate is seen in the right upper lobe which could reflect pneumonia/airspace disease versus aspiration. Endotracheal tube projects at the thoracic inlet approximately 5.8 cm above the precious.  No evidence of pleural effusion or pneumothorax.  Bones appear intact. Gastric distention is partially visualized.   Impression        Infiltrate is seen in the right upper lobe which could reflect pneumonia/airspace disease versus aspiration.     Gastric distention.    No orogastric tube is seen on the current study.      Electronically signed by: CHUY AYALA MD  Date: 11/20/17  Time: 08:03        Narrative     Date of Procedure: 11/20/2017        TEST DESCRIPTION   Technical Quality: This is a technically challenging study. This study was performed in conjunction with a 3ml intravenous injection of Optison contrast agent.     Aorta: The aortic root is normal in size, measuring 2.7 cm at sinotubular junction and 2.8 cm at Sinuses of Valsalva. The proximal ascending aorta is normal in size, measuring 2.7 cm across.     Left Atrium: The left atrial volume index is normal, measuring 7.31 cc/m2.     Left Ventricle: The left ventricle is normal in size, with an end-diastolic diameter of 3.6 cm, and an end-systolic diameter of 3.1 cm. LV wall thickness is normal, with the septum measuring 1.1 cm and the posterior wall measuring 0.9 cm across. Relative   wall thickness was increased at 0.50, and the LV mass index was 65.5 g/m2 consistent with  concentric remodeling. The inferolateral wall is severely hypokinetic. The apex is moderately hypokinetic.   The following segments were severely hypokinetic: mid inferior wall, basal inferior wall.  Left ventricular systolic function appears severely depressed. Visually estimated ejection fraction is 15-20%. The LV Doppler derived stroke volume equals 21.0 ccs.     Diastolic indices: E wave velocity 0.3 m/s, E/A ratio 0.7,  msec., E/e' ratio(avg) 6. There is diastolic dysfunction secondary to relaxation abnormality.     Right Atrium: The right atrium is normal in size, measuring 3.0 cm in length and 2.7 cm in width in the apical view.     Right Ventricle: The right ventricle is enlarged in size. Global right ventricular systolic function appears mildly depressed. Tricuspid annular plane systolic excursion (TAPSE) is 1.4 cm. The estimated PA systolic pressure is greater than 21 mmHg.     Aortic Valve:  The aortic valve is normal in structure with normal leaflet mobility. The mean gradient obtained across the aortic valve is 1 mmHg.     Mitral Valve:  The mitral valve is normal in structure with normal leaflet mobility. The pressure half time is 71 msec. The calculated mitral valve area is 3.1 cm2. There is trivial mitral regurgitation.     Tricuspid Valve:  The tricuspid valve is normal in structure with normal leaflet mobility. There is mild tricuspid regurgitation.     Pulmonary Valve:  The pulmonic valve is not well seen.     IVC: The IVC is not visualized.     Intracavitary: There is no evidence of pericardial effusion, intracavity mass, thrombi, or vegetation.         CONCLUSIONS     1 - Severely depressed left ventricular systolic function (EF 15-20%).     2 - Wall motion abnormalities.     3 - Impaired LV relaxation, normal LAP (grade 1 diastolic dysfunction).     4 - Right ventricular enlargement with mildly depressed systolic function.     5 - The estimated PA systolic pressure is greater than 21  mmHg.     6 - Concentric remodeling.     7 - Trivial mitral regurgitation.     8 - Mild tricuspid regurgitation.             This document has been electronically    SIGNED BY: Seven Matt MD On: 11/20/2017 13:27        Ref Range & Units 10:21 1yr ago    EF 55 - 65 15   35      Mitral Valve Regurgitation  TRIVIAL   TRIVIAL TO MILD     Diastolic Dysfunction  Yes   Yes      Est. PA Systolic Pressure  21.16  22.36     Mitral Valve Mobility  NORMAL  NORMAL     Tricuspid Valve Regurgitation  MILD  MILD    Resulting Agency  [facility]

## 2017-11-20 NOTE — ASSESSMENT & PLAN NOTE
- metabolic acidosis today - add bicarb and increased vent rate  - SAT & SBT: purposeful movements, follows commands; extubated with subsequent excessive accessory muscle use and poor oxygenation on high flow NC. Reintubated.   - vent bundle prophylaxis  - fentanyl infusion for sedation  - SBT/SAT again in AM

## 2017-11-20 NOTE — ED NOTES
Pt's glasses and 's license placed into a patient belonging bag and given to patient's daughter (next of kin).

## 2017-11-20 NOTE — ASSESSMENT & PLAN NOTE
- ST segment elevation resolved after TNK reperfusion therapy  - continue ASA, plavix, statin  - no B-Blocker given in cardiogenic shock  - lipid panel in am  - cardiology following planning LHC once more stable

## 2017-11-20 NOTE — PROCEDURES
"Pawan Huang is a 67 y.o. male patient.    Temp: 97.5 °F (36.4 °C) (11/20/17 0700)  Pulse: 68 (11/20/17 1143)  Resp: (!) 0 (11/20/17 1143)  BP: (!) 82/30 (11/20/17 1135)  SpO2: (!) 92 % (11/20/17 0945)  Weight: 61.4 kg (135 lb 5.8 oz) (11/20/17 0315)  Height: 6' 2" (188 cm) (11/20/17 0315)       Arterial Line  Date/Time: 11/20/2017 12:30 PM  Location procedure was performed: [facility] INTENSIVE CARE UNIT  Performed by: EDER GAMBOA  Authorized by: EDER GAMBOA   Pre-op Diagnosis: shock  Post-operative diagnosis: cardiogenic shock  Consent Done: Not Needed  Preparation: Patient was prepped and draped in the usual sterile fashion.  Indications: multiple ABGs, respiratory failure and hemodynamic monitoring  Location: right femoral  Patient sedated: yes  Sedatives: fentanyl and propofol  Vitals: Vital signs were monitored during sedation.  Needle gauge: 20  Seldinger technique: Seldinger technique used  Number of attempts: 1  Complications: No  Estimated blood loss (mL): 2  Specimens: No  Implants: No  Post-procedure: line sutured and dressing applied  Post-procedure CMS: unchanged  Patient tolerance: Patient tolerated the procedure well with no immediate complications  Comments: Radial arterial line previously placed developed poor waveform with dampening and not correlating with NIBP          Eder Gamboa  11/20/2017  "

## 2017-11-20 NOTE — PLAN OF CARE
D/C planning initiated. No family at the bedside. Patient dary intubated. CM unable to determine an optimal d/c plan at the present time. CM to f/u for safe transition     11/20/17 1015   Discharge Assessment   Assessment Type Discharge Planning Assessment   Confirmed/corrected address and phone number on facesheet? No   Assessment information obtained from? Medical Record   Expected Length of Stay (days) (TBD)   Communicated expected length of stay with patient/caregiver no   Prior to hospitilization cognitive status: Unable to Assess;Coma/Sedated/Intubated   Current cognitive status: Unable to Assess;Coma/Sedated/Intubated   Able to Return to Prior Arrangements unable to determine at this time (comments)   Is patient able to care for self after discharge? Unable to determine at this time (comments)   Patient's perception of discharge disposition other (comments)   Readmission Within The Last 30 Days no previous admission in last 30 days   Patient currently being followed by outpatient case management? Unable to determine (comments)   Patient currently receives any other outside agency services? No   Equipment Currently Used at Home other (see comments)   Do you have any problems affording any of your prescribed medications? TBD   Discharge Plan A Other   Discharge Plan B Other   Patient/Family In Agreement With Plan other (see comments)

## 2017-11-20 NOTE — PROGRESS NOTES
Pt reintubated at this time due to low Sao2 with #8.0 ET tube by Joe Gamboa NP. Pt placed back on  vent with current settings.

## 2017-11-20 NOTE — PROCEDURES
"Pawan Huang is a 67 y.o. male patient.    Temp: 97.5 °F (36.4 °C) (11/20/17 0700)  Pulse: 65 (11/20/17 1015)  Resp: (!) 24 (11/20/17 1015)  BP: (!) 84/18 (11/20/17 1015)  SpO2: (!) 92 % (11/20/17 0945)  Weight: 61.4 kg (135 lb 5.8 oz) (11/20/17 0315)  Height: 6' 2" (188 cm) (11/20/17 0315)       Arterial Line  Date/Time: 11/20/2017 10:40 AM  Location procedure was performed: [facility] UNIT  Performed by: EDER GAMBOA  Authorized by: EDER GAMBOA   Pre-op Diagnosis: Cardiogenic shock  Post-operative diagnosis: Cardiogenic shock  Consent Done: Emergent Situation  Preparation: Patient was prepped and draped in the usual sterile fashion.  Indications: multiple ABGs, respiratory failure and hemodynamic monitoring  Location: right radial  Patient sedated: yes  Sedatives: fentanyl and see MAR for details  Analgesia: fentanyl and see MAR for details  Vitals: Vital signs were monitored during sedation.  Robin's test normal: yes  Needle gauge: 20  Seldinger technique: Seldinger technique used  Number of attempts: 1  Technical procedures used: ultrasound  Complications: No  Estimated blood loss (mL): 3  Specimens: No  Implants: No  Post-procedure: line sutured and dressing applied  Post-procedure CMS: normal  Patient tolerance: Patient tolerated the procedure well with no immediate complications          Eder Gamboa  11/20/2017  "

## 2017-11-20 NOTE — CONSULTS
[facility]  Critical Care Medicine  Consult Note    Patient Name: Pawan Huang  MRN: [id number]  Admission Date: 11/19/2017  Hospital Length of Stay: 0 days  Code Status: Full Code  Attending Physician: Melissa Mcdonough MD   Primary Care Provider: Hang Sanchez MD   Principal Problem: Cardiogenic shock      Subjective:     HPI:  Mr. Huang is a 68 yo male with history of HTN,  DVT with PE in 2016, CAD with stents in 2010, CM, diabetes and 1/2 ppd smoker presented to [facility] ED via EMS after witnessed cardiac arrest with bystander CPR at home. EMS shocked twice with ROSC. Upon arrival to ED, found to be in PEA arrest, CPR resumed. Pulse returned at following pulse check.  History obtained from the EMR. STEMI in anterolateral leads. TNK for reperfusion. Transported to [facility] via air med for ongoing vent management. HPI and ROS are limited secondary to patient condition.         Hospital/ICU Course:  11/19 - cardiac arrest, presentation to [facility] ED post cardiac arrest in late evening  11/20 - admitted to [facility] ICU on max Levophed and Dopamine infusions with Fentanyl, Heparin and Precedex also infusing    Past Medical History:   Diagnosis Date    Bilateral pulmonary embolism 2/23/2016    CAD S/P percutaneous coronary angioplasty 2010    Cardiomyopathy     Current smoker     DVT (deep venous thrombosis)     Hypertension        Past Surgical History:   Procedure Laterality Date    CORONARY ANGIOPLASTY WITH STENT PLACEMENT  2010       Review of patient's allergies indicates:  No Known Allergies    Family History     Problem Relation (Age of Onset)    Cancer Mother    Deep vein thrombosis Mother, Brother, Sister    Diabetes Mellitus Sister    Heart attacks under age 50 Father    Hypertension Sister        Social History Main Topics    Smoking status: Current Every Day Smoker     Packs/day: 0.50     Years: 45.00     Types: Cigarettes    Smokeless tobacco: Former User     Quit  date: 2/23/2016    Alcohol use 0.0 oz/week      Comment: Drinks socially, maybe one drink a month    Drug use: No    Sexual activity: Yes         Review of Systems   Unable to perform ROS: Intubated     Objective:     Vital Signs (Most Recent):  Temp: 97.5 °F (36.4 °C) (11/20/17 0700)  Pulse: (!) 58 (11/20/17 0844)  Resp: (!) 22 (11/20/17 0844)  BP: 112/77 (11/20/17 0835)  SpO2: 99 % (11/20/17 0844) Vital Signs (24h Range):  Temp:  [97.4 °F (36.3 °C)-97.9 °F (36.6 °C)] 97.5 °F (36.4 °C)  Pulse:  [] 58  Resp:  [10-35] 22  SpO2:  [73 %-100 %] 99 %  BP: ()/(28-78) 112/77     Weight: 61.4 kg (135 lb 5.8 oz)  Body mass index is 17.38 kg/m².      Intake/Output Summary (Last 24 hours) at 11/20/17 0849  Last data filed at 11/20/17 0817   Gross per 24 hour   Intake          3367.97 ml   Output              185 ml   Net          3182.97 ml       Physical Exam   Constitutional: He appears well-developed and well-nourished. He is easily aroused. He has a sickly appearance. He appears ill. No distress. He is intubated.   HENT:   Head: Normocephalic and atraumatic.   Mouth/Throat: Mucous membranes are normal.   Old blood noted in oral cavity and pharynx   Eyes: EOM and lids are normal.   Pupils pinpoint   Neck: Trachea normal and normal range of motion. Carotid bruit is not present.   Cardiovascular: Normal rate, regular rhythm and normal heart sounds.    Pulses:       Radial pulses are 1+ on the right side, and 1+ on the left side.        Dorsalis pedis pulses are 1+ on the right side, and 1+ on the left side.   Pulmonary/Chest: Effort normal and breath sounds normal. No accessory muscle usage. He is intubated. No respiratory distress.   Abdominal: Soft. Normal appearance. He exhibits no distension. Bowel sounds are decreased. There is no tenderness.   Genitourinary: Penis normal.   Genitourinary Comments: Herrera catheter   Musculoskeletal: Normal range of motion.        Right foot: There is no deformity.         Left foot: There is no deformity.   No edema   Lymphadenopathy:     He has no cervical adenopathy.   Neurological: He is alert and easily aroused.   Eyes open and following commands intermittently on SAT   Skin: Skin is warm, dry and intact. Capillary refill takes more than 3 seconds. No rash noted. No cyanosis. Nails show clubbing.            Vents:  Vent Mode: Spont (11/20/17 0844)  Set Rate: 0 bmp (11/20/17 0844)  Vt Set: 450 mL (11/20/17 0844)  Pressure Support: 8 cmH20 (11/20/17 0844)  PEEP/CPAP: 3 cmH20 (11/20/17 0844)  Oxygen Concentration (%): 35 (11/20/17 0835)  Peak Airway Pressure: 11 cmH2O (11/20/17 0844)  Plateau Pressure: 0 cmH20 (11/20/17 0844)  Total Ve: 5.53 mL (11/20/17 0844)  F/VT Ratio<105 (RSBI): (!) 54.23 (11/20/17 0757)    Lines/Drains/Airways     Drain                 NG/OG Tube 11/20/17 0031 [place] sump 16 Fr. Left mouth less than 1 day         Urethral Catheter 11/19/17 2220 Latex 16 Fr. less than 1 day          Airway                 Airway - Non-Surgical 11/19/17 2136 Endotracheal Tube less than 1 day          Peripheral Intravenous Line                 Peripheral IV - Single Lumen 11/19/17 2139 Left Forearm less than 1 day         Peripheral IV - Single Lumen 11/19/17 2139 Right Forearm less than 1 day         Peripheral IV - Single Lumen 11/20/17 0143 Left Forearm less than 1 day                Significant Labs:    CBC/Anemia Profile:    Recent Labs  Lab 11/19/17 2200 11/20/17 0143 11/20/17 0413   WBC 7.21 9.90 11.38   HGB 15.6 12.1* 11.9*   HCT 48.5 38.0* 38.6*   PLT 83* 90* 93*   * 101* 103*   RDW 13.1 13.2 13.2        Chemistries:    Recent Labs  Lab 11/19/17 2200 11/20/17 0143 11/20/17  0413    144 141   K 3.6 4.3 4.6    112* 116*   CO2 14* 19* 11*   BUN 22 27* 28*   CREATININE 1.5* 1.6* 1.8*   CALCIUM 8.9 7.5* 7.3*   ALBUMIN 3.1* 2.2* 2.4*   PROT 6.7 4.3* 4.8*   BILITOT 0.5 0.3 0.3   ALKPHOS 188* 151* 175*   * 586* 572*   * 531* 668*   MG  --    --  2.0       ABGs:   Recent Labs  Lab 11/20/17  0424   PH 7.147*   PCO2 45.4*   HCO3 15.7*   POCSATURATED 94*   BE -13     Troponin:   Recent Labs  Lab 11/19/17  2200 11/20/17  0143   TROPONINI 0.106* 5.778*       Significant Imaging:   CXR: I have reviewed all pertinent results/findings within the past 24 hours and my personal findings are:  no focal process, pulmonary edema  Echo: I have reviewed all pertinent results/findings within the past 24 hours and my personal findings are:  current echo pending, last year HFrEF 35%  EKG: I have reviewed all pertinent results/findings within the past 24 hours and my personal findings are: ST segment elevation resolved    Assessment/Plan:     Pulmonary   Acute hypercapnic respiratory failure    - metabolic acidosis today - add bicarb and increased vent rate  - SAT & SBT: purposeful movements, follows commands; extubated with subsequent excessive accessory muscle use and poor oxygenation on high flow NC. Reintubated.   - vent bundle prophylaxis  - fentanyl infusion for sedation  - SBT/SAT again in AM        Cardiac/Vascular   * Cardiogenic shock    Cont Levophed infusion and wean as tolerated        Essential hypertension    - currently on vasopressor infusion        Heart failure, acute on chronic, systolic and diastolic    - Cards following  - repeat Echo pending  - [facility] in future once more stable  - wean Levophed as tolerated        Heart failure with reduced ejection fraction    - echo pending  - last year systolic and diastolic failure with EF 35%  - levophed for BP support        ST elevation myocardial infarction involving left anterior descending (LAD) coronary artery    - ST segment elevation resolved after TNK reperfusion therapy  - continue ASA, plavix, statin  - no B-Blocker given in cardiogenic shock  - lipid panel in am  - cardiology following planning [facility] once more stable        Renal/   SELENE (acute kidney injury)    - anuric  - ingram in place and monitor  I/Os  - BMP in AM  - support BP        Metabolic acidosis    Bicarb IVP then add Bicarb to IVFs        Hematology   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   - Post TNK. On heparin infusion  - daily CBC and PT/INR        Endocrine   Hyperglycemia    - home med: saxagliptin  - hyperglycemic on presentation  - SSI q4 hours, IVP insulin this am for glucose >400  - HgA1C in am        GI   Shock liver    - transaminitis post arrest  - continue statin for now  - daily CMP        Other   Tobacco abuse    - encourage tobacco cessation once extubated and alert           Preventive Measures and Monitoring:   Stress Ulcer: Pepcid  Nutrition: NPO  Glucose control: SSI  Bowel prophylaxis: PRN Dulcolax  DVT prophylaxis: Heparin infusion  Hx CAD on B-Blocker: add Lopressor once off pressor  Head of Bed/Reposition: Elevate HOB and turn Q1-2 hours   Early Mobility: OOB once more stable  SAT/SBT: daily.  Failed extubation today  RASS goal: -1  Vent Day: #1  Herrera Day: #1  Code Status: Full  Pneumonia Vaccine: ordered  Flu Vaccine: ordered    Counseling/Consultation:I have discussed the care of this patient in detail with the bedside nursing staff and Dr. Cantu and Dr. Mcdonough and Dr. Riggins and Dr. Rodriguez    Critical Care Time: 120 minutes  Critical secondary to Patient has a condition that poses threat to life and bodily function: Severe Respiratory Distress and Acute Renal Failure  Patient has an abrupt change in neurologic status: Encephalopathy  Patient is currently on drug therapy requiring intensive monitoring for toxicity: Levophed infusion  Patient is currently receiving parenteral controlled substances: Fentanyl infusion     Critical care was time spent personally by me on the following activities: development of treatment plan with patient or surrogate and bedside caregivers, discussions with consultants,  evaluation of patient's response to treatment, examination of patient, ordering and performing treatments and interventions, ordering and review of laboratory studies, ordering and review of radiographic studies, pulse oximetry, re-evaluation of patient's condition. This critical care time did not overlap with that of any other provider or involve time for any procedures.    Thank you for your consult. I will follow-up with patient. Please contact us if you have any additional questions.     Haider Gamboa NP  Critical Care Medicine  [facility]

## 2017-11-20 NOTE — PROGRESS NOTES
CODE Blue called by RN with patient loosing BP per arterial line and PEA rhythm.  ACLS initiated with CPR in 2 min intervals as well as receiving 3 amps Epi and 2 amps Bicarb IVP with code.  ROSC attained but agonal resp effort.  Dr. Matt called to bedside and reported out EF 15% at best and very poor prognosis. Daughters and sons here and informed of code and multi organ system failure and worsening critical state.  Vinicius Huang and family elected DNR and comfort care.    CHRISSIE Gamboa ACNP-BC    30 more minutes additional critical care time.

## 2017-11-20 NOTE — ASSESSMENT & PLAN NOTE
- Cards following  - repeat Echo pending  - [facility] in future once more stable  - wean Levophed as tolerated
